# Patient Record
Sex: FEMALE | Race: WHITE | NOT HISPANIC OR LATINO | Employment: UNEMPLOYED | URBAN - METROPOLITAN AREA
[De-identification: names, ages, dates, MRNs, and addresses within clinical notes are randomized per-mention and may not be internally consistent; named-entity substitution may affect disease eponyms.]

---

## 2020-01-06 ENCOUNTER — OFFICE VISIT (OUTPATIENT)
Dept: INTERNAL MEDICINE | Facility: CLINIC | Age: 30
End: 2020-01-06
Payer: COMMERCIAL

## 2020-01-06 VITALS
BODY MASS INDEX: 51.91 KG/M2 | WEIGHT: 293 LBS | HEIGHT: 63 IN | SYSTOLIC BLOOD PRESSURE: 122 MMHG | TEMPERATURE: 97.4 F | RESPIRATION RATE: 16 BRPM | HEART RATE: 104 BPM | DIASTOLIC BLOOD PRESSURE: 84 MMHG | OXYGEN SATURATION: 96 %

## 2020-01-06 DIAGNOSIS — R05.9 COUGH: Primary | ICD-10-CM

## 2020-01-06 LAB
DEPRECATED S PYO AG THROAT QL EIA: NORMAL
FLUAV+FLUBV AG SPEC QL: NEGATIVE
FLUAV+FLUBV AG SPEC QL: NEGATIVE
SPECIMEN SOURCE: NORMAL
SPECIMEN SOURCE: NORMAL

## 2020-01-06 PROCEDURE — 87804 INFLUENZA ASSAY W/OPTIC: CPT | Performed by: NURSE PRACTITIONER

## 2020-01-06 PROCEDURE — 99202 OFFICE O/P NEW SF 15 MIN: CPT | Performed by: NURSE PRACTITIONER

## 2020-01-06 PROCEDURE — 87081 CULTURE SCREEN ONLY: CPT | Performed by: NURSE PRACTITIONER

## 2020-01-06 PROCEDURE — 87880 STREP A ASSAY W/OPTIC: CPT | Performed by: NURSE PRACTITIONER

## 2020-01-06 RX ORDER — ALBUTEROL SULFATE 90 UG/1
2 AEROSOL, METERED RESPIRATORY (INHALATION) EVERY 6 HOURS
Qty: 1 INHALER | Refills: 0 | Status: SHIPPED | OUTPATIENT
Start: 2020-01-06 | End: 2020-07-24

## 2020-01-06 SDOH — HEALTH STABILITY: MENTAL HEALTH: HOW OFTEN DO YOU HAVE A DRINK CONTAINING ALCOHOL?: NEVER

## 2020-01-06 ASSESSMENT — MIFFLIN-ST. JEOR: SCORE: 2101.89

## 2020-01-06 NOTE — NURSING NOTE
"cold starting 12/26/19, fever,chills and cough kicked on on 01/01/2020  /84 (BP Location: Right arm, Patient Position: Sitting, Cuff Size: Adult Large)   Pulse 104   Temp 97.4  F (36.3  C) (Oral)   Resp 16   Ht 1.588 m (5' 2.5\")   Wt 140.6 kg (309 lb 14.4 oz)   LMP 12/31/2019 (Exact Date)   SpO2 96%   BMI 55.78 kg/m      "

## 2020-01-06 NOTE — PROGRESS NOTES
"Subjective     Iris Toscano is a 29 year old female who presents to clinic today for the following health issues:    HPI   RESPIRATORY SYMPTOMS      Duration: 10 days    Description  nasal congestion, rhinorrhea, sore throat, cough, ear pain bilateral and fatigue/malaise  Initial \"cold\" sx started to resolve.  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    History reviewed. No pertinent surgical history.    Social History     Tobacco Use     Smoking status: Never Smoker     Smokeless tobacco: Never Used   Substance Use Topics     Alcohol use: Never     Frequency: Never     Family History   Problem Relation Age of Onset     Hypertension Maternal Grandmother      Cerebrovascular Disease Maternal Grandmother      Breast Cancer Maternal Grandmother      C.A.D. Maternal Grandmother      Breast Cancer Mother      Cancer Mother         leukemia     Lipids Mother      Thyroid Disease Mother      Depression Maternal Aunt      Lipids Father      Lipids Sister      Lipids Other         grandparents     Diabetes Maternal Grandfather          No current outpatient medications on file.     BP Readings from Last 3 Encounters:   01/06/20 122/84   01/26/12 129/63   01/04/11 118/70    Wt Readings from Last 3 Encounters:   01/06/20 140.6 kg (309 lb 14.4 oz)   01/04/11 113.9 kg (251 lb) (>99 %)*     * Growth percentiles are based on CDC (Girls, 2-20 Years) data.                      Reviewed and updated as needed this visit by Provider         Review of Systems   ROS COMP: RESP:NEGATIVE for SOB/dyspnea and wheezing  CV: NEGATIVE for chest pain, palpitations or peripheral edema  ROS otherwise " "negative      Objective    /84 (BP Location: Right arm, Patient Position: Sitting, Cuff Size: Adult Large)   Pulse 104   Temp 97.4  F (36.3  C) (Oral)   Resp 16   Ht 1.588 m (5' 2.5\")   Wt 140.6 kg (309 lb 14.4 oz)   LMP 12/31/2019 (Exact Date)   SpO2 96%   BMI 55.78 kg/m    Body mass index is 55.78 kg/m .  Physical Exam   GENERAL: alert, no distress, obese and fatigued  EYES: Eyes grossly normal to inspection  HENT: ear canals and TM's normal, nose and mouth without ulcers or lesions  NECK: no adenopathy, no asymmetry, masses, or scars and thyroid normal to palpation  RESP: lungs clear to auscultation - no rales, rhonchi or wheezes  CV: regular rate and rhythm, normal S1 S2, no S3 or S4, no murmur, click or rub, no peripheral edema and peripheral pulses strong  PSYCH: mentation appears normal, tearful, anxious and fatigued    Results for orders placed or performed in visit on 01/06/20 (from the past 24 hour(s))   Rapid strep screen   Result Value Ref Range    Specimen Description Throat     Rapid Strep A Screen       NEGATIVE: No Group A streptococcal antigen detected by immunoassay, await culture report.   Influenza A/B antigen   Result Value Ref Range    Influenza A/B Agn Specimen Nasal     Influenza A Negative NEG^Negative    Influenza B Negative NEG^Negative           Assessment & Plan       ICD-10-CM    1. Cough R05 Rapid strep screen     Influenza A/B antigen     Beta strep group A culture     albuterol (PROAIR HFA/PROVENTIL HFA/VENTOLIN HFA) 108 (90 Base) MCG/ACT inhaler        BMI:   Estimated body mass index is 55.78 kg/m  as calculated from the following:    Height as of this encounter: 1.588 m (5' 2.5\").    Weight as of this encounter: 140.6 kg (309 lb 14.4 oz).           Inhaler as directed  Pt declined tessalon and robitussin AC  Elevated HOB  RTW letter done.  Consider yearly flu shot    Bettina Andrade NP  [facility]        "

## 2020-01-06 NOTE — LETTER
January 6, 2020      Iris Toscano  [address] [id number]        To Whom It May Concern:    Iris Toscano was seen in our clinic for upper respiratory illness. She may return to work with the following: no work in cold temperatures  For 1 weekk.      Sincerely,        Bettina Andrade NP

## 2020-01-07 LAB
BACTERIA SPEC CULT: NORMAL
SPECIMEN SOURCE: NORMAL

## 2020-07-24 ENCOUNTER — OFFICE VISIT (OUTPATIENT)
Dept: INTERNAL MEDICINE | Facility: CLINIC | Age: 30
End: 2020-07-24
Payer: COMMERCIAL

## 2020-07-24 VITALS
WEIGHT: 293 LBS | HEART RATE: 93 BPM | SYSTOLIC BLOOD PRESSURE: 120 MMHG | DIASTOLIC BLOOD PRESSURE: 70 MMHG | OXYGEN SATURATION: 97 % | TEMPERATURE: 98 F | RESPIRATION RATE: 16 BRPM | BODY MASS INDEX: 51.91 KG/M2 | HEIGHT: 63 IN

## 2020-07-24 DIAGNOSIS — Z00.00 ROUTINE HISTORY AND PHYSICAL EXAMINATION OF ADULT: Primary | ICD-10-CM

## 2020-07-24 DIAGNOSIS — F32.0 CURRENT MILD EPISODE OF MAJOR DEPRESSIVE DISORDER WITHOUT PRIOR EPISODE (H): ICD-10-CM

## 2020-07-24 DIAGNOSIS — N92.6 IRREGULAR PERIODS: ICD-10-CM

## 2020-07-24 DIAGNOSIS — R20.0 NUMBNESS: ICD-10-CM

## 2020-07-24 DIAGNOSIS — F41.1 GAD (GENERALIZED ANXIETY DISORDER): ICD-10-CM

## 2020-07-24 DIAGNOSIS — R32 URINARY INCONTINENCE, UNSPECIFIED TYPE: ICD-10-CM

## 2020-07-24 DIAGNOSIS — E66.01 MORBID OBESITY (H): ICD-10-CM

## 2020-07-24 DIAGNOSIS — F84.5 ASPERGER'S DISORDER: ICD-10-CM

## 2020-07-24 DIAGNOSIS — R73.9 HYPERGLYCEMIA: ICD-10-CM

## 2020-07-24 LAB
ALBUMIN UR-MCNC: NEGATIVE MG/DL
APPEARANCE UR: ABNORMAL
BACTERIA #/AREA URNS HPF: ABNORMAL /HPF
BASOPHILS # BLD AUTO: 0 10E9/L (ref 0–0.2)
BASOPHILS NFR BLD AUTO: 0.1 %
BILIRUB UR QL STRIP: NEGATIVE
COLOR UR AUTO: YELLOW
DIFFERENTIAL METHOD BLD: ABNORMAL
EOSINOPHIL # BLD AUTO: 0 10E9/L (ref 0–0.7)
EOSINOPHIL NFR BLD AUTO: 0.1 %
ERYTHROCYTE [DISTWIDTH] IN BLOOD BY AUTOMATED COUNT: 14.9 % (ref 10–15)
GLUCOSE UR STRIP-MCNC: NEGATIVE MG/DL
HBA1C MFR BLD: 5.3 % (ref 0–5.6)
HCT VFR BLD AUTO: 37.4 % (ref 35–47)
HGB BLD-MCNC: 12 G/DL (ref 11.7–15.7)
HGB UR QL STRIP: ABNORMAL
KETONES UR STRIP-MCNC: NEGATIVE MG/DL
LEUKOCYTE ESTERASE UR QL STRIP: NEGATIVE
LYMPHOCYTES # BLD AUTO: 2.3 10E9/L (ref 0.8–5.3)
LYMPHOCYTES NFR BLD AUTO: 26 %
MCH RBC QN AUTO: 25.1 PG (ref 26.5–33)
MCHC RBC AUTO-ENTMCNC: 32.1 G/DL (ref 31.5–36.5)
MCV RBC AUTO: 78 FL (ref 78–100)
MONOCYTES # BLD AUTO: 0.7 10E9/L (ref 0–1.3)
MONOCYTES NFR BLD AUTO: 7.7 %
NEUTROPHILS # BLD AUTO: 5.8 10E9/L (ref 1.6–8.3)
NEUTROPHILS NFR BLD AUTO: 66.1 %
NITRATE UR QL: NEGATIVE
NON-SQ EPI CELLS #/AREA URNS LPF: ABNORMAL /LPF
PH UR STRIP: 5.5 PH (ref 5–7)
PLATELET # BLD AUTO: 282 10E9/L (ref 150–450)
RBC # BLD AUTO: 4.78 10E12/L (ref 3.8–5.2)
RBC #/AREA URNS AUTO: ABNORMAL /HPF
SOURCE: ABNORMAL
SP GR UR STRIP: 1.02 (ref 1–1.03)
UROBILINOGEN UR STRIP-ACNC: 0.2 EU/DL (ref 0.2–1)
VIT B12 SERPL-MCNC: 417 PG/ML (ref 193–986)
WBC # BLD AUTO: 8.8 10E9/L (ref 4–11)
WBC #/AREA URNS AUTO: ABNORMAL /HPF

## 2020-07-24 PROCEDURE — 83036 HEMOGLOBIN GLYCOSYLATED A1C: CPT | Performed by: INTERNAL MEDICINE

## 2020-07-24 PROCEDURE — 80053 COMPREHEN METABOLIC PANEL: CPT | Performed by: INTERNAL MEDICINE

## 2020-07-24 PROCEDURE — 80061 LIPID PANEL: CPT | Performed by: INTERNAL MEDICINE

## 2020-07-24 PROCEDURE — 81001 URINALYSIS AUTO W/SCOPE: CPT | Performed by: INTERNAL MEDICINE

## 2020-07-24 PROCEDURE — 84443 ASSAY THYROID STIM HORMONE: CPT | Performed by: INTERNAL MEDICINE

## 2020-07-24 PROCEDURE — 36415 COLL VENOUS BLD VENIPUNCTURE: CPT | Performed by: INTERNAL MEDICINE

## 2020-07-24 PROCEDURE — 85025 COMPLETE CBC W/AUTO DIFF WBC: CPT | Performed by: INTERNAL MEDICINE

## 2020-07-24 PROCEDURE — 99395 PREV VISIT EST AGE 18-39: CPT | Performed by: INTERNAL MEDICINE

## 2020-07-24 PROCEDURE — 99213 OFFICE O/P EST LOW 20 MIN: CPT | Mod: 25 | Performed by: INTERNAL MEDICINE

## 2020-07-24 PROCEDURE — 82607 VITAMIN B-12: CPT | Performed by: INTERNAL MEDICINE

## 2020-07-24 RX ORDER — BUPROPION HYDROCHLORIDE 150 MG/1
150 TABLET ORAL EVERY MORNING
Qty: 90 TABLET | Refills: 0 | Status: SHIPPED | OUTPATIENT
Start: 2020-07-24 | End: 2020-10-19

## 2020-07-24 ASSESSMENT — ANXIETY QUESTIONNAIRES
1. FEELING NERVOUS, ANXIOUS, OR ON EDGE: NEARLY EVERY DAY
6. BECOMING EASILY ANNOYED OR IRRITABLE: MORE THAN HALF THE DAYS
GAD7 TOTAL SCORE: 13
5. BEING SO RESTLESS THAT IT IS HARD TO SIT STILL: SEVERAL DAYS
3. WORRYING TOO MUCH ABOUT DIFFERENT THINGS: MORE THAN HALF THE DAYS
2. NOT BEING ABLE TO STOP OR CONTROL WORRYING: MORE THAN HALF THE DAYS
7. FEELING AFRAID AS IF SOMETHING AWFUL MIGHT HAPPEN: MORE THAN HALF THE DAYS

## 2020-07-24 ASSESSMENT — PATIENT HEALTH QUESTIONNAIRE - PHQ9
5. POOR APPETITE OR OVEREATING: SEVERAL DAYS
SUM OF ALL RESPONSES TO PHQ QUESTIONS 1-9: 15

## 2020-07-24 ASSESSMENT — ACTIVITIES OF DAILY LIVING (ADL): CURRENT_FUNCTION: NO ASSISTANCE NEEDED

## 2020-07-24 ASSESSMENT — MIFFLIN-ST. JEOR: SCORE: 2168.78

## 2020-07-24 NOTE — LETTER
July 28, 2020      Iris Toscano  [address][1] [id number]        Dear ,    We are writing to inform you of your test results.    The cholesterol level LDL is at goal.   The kidney function (GFR) and liver function tests (AST and ALT) are within normal limits.     The fasting glucose is within normal limits. The hemoglobin A1c is within normal limits.   The thyroid test (TSH) is within normal limits.   The blood counts (CBC) are within normal limits.   The B12 is within normal limits.     Resulted Orders   Lipid panel reflex to direct LDL Fasting   Result Value Ref Range    Cholesterol 150 <200 mg/dL    Triglycerides 66 <150 mg/dL      Comment:      Fasting specimen    HDL Cholesterol 36 (L) >49 mg/dL    LDL Cholesterol Calculated 101 (H) <100 mg/dL      Comment:      Above desirable:  100-129 mg/dl  Borderline High:  130-159 mg/dL  High:             160-189 mg/dL  Very high:       >189 mg/dl      Non HDL Cholesterol 114 <130 mg/dL   **Comprehensive metabolic panel FUTURE anytime   Result Value Ref Range    Sodium 136 133 - 144 mmol/L    Potassium 4.0 3.4 - 5.3 mmol/L    Chloride 108 94 - 109 mmol/L    Carbon Dioxide 21 20 - 32 mmol/L    Anion Gap 8 3 - 14 mmol/L    Glucose 90 70 - 99 mg/dL      Comment:      Fasting specimen    Urea Nitrogen 14 7 - 30 mg/dL    Creatinine 0.65 0.52 - 1.04 mg/dL    GFR Estimate >90 >60 mL/min/[1.73_m2]      Comment:      Non  GFR Calc  Starting 12/18/2018, serum creatinine based estimated GFR (eGFR) will be   calculated using the Chronic Kidney Disease Epidemiology Collaboration   (CKD-EPI) equation.      GFR Estimate If Black >90 >60 mL/min/[1.73_m2]      Comment:       GFR Calc  Starting 12/18/2018, serum creatinine based estimated GFR (eGFR) will be   calculated using the Chronic Kidney Disease Epidemiology Collaboration   (CKD-EPI) equation.      Calcium 8.3 (L) 8.5 - 10.1 mg/dL    Bilirubin Total 0.3 0.2 - 1.3 mg/dL     Albumin 3.3 (L) 3.4 - 5.0 g/dL    Protein Total 7.5 6.8 - 8.8 g/dL    Alkaline Phosphatase 74 40 - 150 U/L    ALT 18 0 - 50 U/L    AST 11 0 - 45 U/L   **TSH with free T4 reflex FUTURE anytime   Result Value Ref Range    TSH 2.81 0.40 - 4.00 mU/L   CBC with platelets and differential   Result Value Ref Range    WBC 8.8 4.0 - 11.0 10e9/L    RBC Count 4.78 3.8 - 5.2 10e12/L    Hemoglobin 12.0 11.7 - 15.7 g/dL    Hematocrit 37.4 35.0 - 47.0 %    MCV 78 78 - 100 fl    MCH 25.1 (L) 26.5 - 33.0 pg    MCHC 32.1 31.5 - 36.5 g/dL    RDW 14.9 10.0 - 15.0 %    Platelet Count 282 150 - 450 10e9/L    % Neutrophils 66.1 %    % Lymphocytes 26.0 %    % Monocytes 7.7 %    % Eosinophils 0.1 %    % Basophils 0.1 %    Absolute Neutrophil 5.8 1.6 - 8.3 10e9/L    Absolute Lymphocytes 2.3 0.8 - 5.3 10e9/L    Absolute Monocytes 0.7 0.0 - 1.3 10e9/L    Absolute Eosinophils 0.0 0.0 - 0.7 10e9/L    Absolute Basophils 0.0 0.0 - 0.2 10e9/L    Diff Method Automated Method    Hemoglobin A1c   Result Value Ref Range    Hemoglobin A1C 5.3 0 - 5.6 %      Comment:      Normal <5.7% Prediabetes 5.7-6.4%  Diabetes 6.5% or higher - adopted from ADA   consensus guidelines.     Vitamin B12   Result Value Ref Range    Vitamin B12 417 193 - 986 pg/mL   UA with Microscopic reflex to Culture   Result Value Ref Range    Color Urine Yellow     Appearance Urine Slightly Cloudy     Glucose Urine Negative NEG^Negative mg/dL    Bilirubin Urine Negative NEG^Negative    Ketones Urine Negative NEG^Negative mg/dL    Specific Gravity Urine 1.025 1.003 - 1.035    pH Urine 5.5 5.0 - 7.0 pH    Protein Albumin Urine Negative NEG^Negative mg/dL    Urobilinogen Urine 0.2 0.2 - 1.0 EU/dL    Nitrite Urine Negative NEG^Negative    Blood Urine Trace (A) NEG^Negative    Leukocyte Esterase Urine Negative NEG^Negative    Source Midstream Urine     WBC Urine 0 - 5 OTO5^0 - 5 /HPF    RBC Urine O - 2 OTO2^O - 2 /HPF    Squamous Epithelial /LPF Urine Many (A) FEW^Few /LPF    Bacteria  Urine Moderate (A) NEG^Negative /HPF       If you have any questions or concerns, please call the clinic at the number listed above.       Sincerely,        Amanda Connell MD

## 2020-07-24 NOTE — PROGRESS NOTES
"   SUBJECTIVE:   CC: Iris Toscano is an 30 year old woman who presents for preventive health visit.     Healthy Habits:    In general, how would you rate your overall health?  Fair    Frequency of exercise:  4-5 days/week    Duration of exercise:  Other    Do you usually eat at least 4 servings of fruit and vegetables a day, include whole grains    & fiber and avoid regularly eating high fat or \"junk\" foods?  No    Taking medications regularly:  Not Applicable    Barriers to taking medications:  Not applicable    Medication side effects:  Not applicable    Ability to successfully perform activities of daily living:  No assistance needed    Home Safety:  No safety concerns identified    Hearing Impairment:  No hearing concerns    In the past 6 months, have you been bothered by leaking of urine? Yes    In general, how would you rate your overall mental or emotional health?  Fair      PHQ-2 Total Score:    Additional concerns today:  No    Depression.  phq9 of 15 today.  Would not harm self.   JAYLYN of 13 today.  Has been on medication for mood in the past.    She reports that she has seen therapists in the past since age 11.   She did not know she had autism until college.      Obesity. No regular exercise.  She is not on a diet. She has done drastic fasting before.  3 days per week no eating    The patient also has had irregular periods since age [age].      March of 2018 she started a job.  She is a .She noticed issues of left leg numbness.  The patient was standing in choir and started to become numb in her knee.    The patient report that there is more numbness/tingling around her knee.  No back pain.    The patient reports that she locks her left leg.    The numbness/tingling is now constant.      Today's PHQ-2 Score: No flowsheet data found.    Abuse: Current or Past(Physical, Sexual or Emotional)- No  Do you feel safe in your environment? Yes      Social History     Tobacco Use     Smoking status: Never " "Smoker     Smokeless tobacco: Never Used   Substance Use Topics     Alcohol use: Never     Frequency: Never     If you drink alcohol do you typically have >3 drinks per day or >7 drinks per week? No    Alcohol Use 7/24/2020   Prescreen: >3 drinks/day or >7 drinks/week? No       Reviewed orders with patient.  Reviewed health maintenance and updated orders accordingly - Yes      Mammogram not appropriate for this patient based on age.    Pertinent mammograms are reviewed under the imaging tab.  History of abnormal Pap smear: not sexually active     Reviewed and updated as needed this visit by clinical staff  Tobacco  Allergies  Meds  Problems  Med Hx  Surg Hx  Fam Hx  Soc Hx          Reviewed and updated as needed this visit by Provider  Allergies  Meds  Problems            Review of Systems  CONSTITUTIONAL: NEGATIVE for fever, chills, change in weight  INTEGUMENTARU/SKIN: NEGATIVE for worrisome rashes, moles or lesions  EYES: NEGATIVE for vision changes or irritation  ENT: NEGATIVE for ear, mouth and throat problems  RESP: NEGATIVE for significant cough or SOB  BREAST: NEGATIVE for masses, tenderness or discharge  CV: NEGATIVE for chest pain, palpitations or peripheral edema  GI: NEGATIVE for nausea, abdominal pain, heartburn, or change in bowel habits  : NEGATIVE for unusual urinary or vaginal symptoms. Periods are irregular.   MUSCULOSKELETAL: NEGATIVE for significant arthralgias or myalgia  NEURO: NEGATIVE for weakness, dizziness or paresthesias  PSYCHIATRIC: NEGATIVE for changes in mood or affect     OBJECTIVE:   /70   Pulse 93   Temp 98  F (36.7  C) (Oral)   Resp 16   Ht 1.6 m (5' 3\")   Wt 147 kg (324 lb)   LMP 07/05/2020   SpO2 97%   Breastfeeding No   BMI 57.39 kg/m    Physical Exam  GENERAL: healthy, alert and no distress  EYES: Eyes grossly normal to inspection, PERRL and conjunctivae and sclerae normal  HENT: ear canals and TM's normal, nose and mouth without ulcers or " lesions  NECK: no adenopathy, no asymmetry, masses, or scars and thyroid normal to palpation  RESP: lungs clear to auscultation - no rales, rhonchi or wheezes  BREAST: normal without masses, tenderness or nipple discharge and no palpable axillary masses or adenopathy  CV: regular rate and rhythm, normal S1 S2, no S3 or S4, no murmur, click or rub, no peripheral edema and peripheral pulses strong  ABDOMEN: soft, nontender, no hepatosplenomegaly, no masses and bowel sounds normal  MS: no gross musculoskeletal defects noted, no edema  SKIN: no suspicious lesions or rashes  NEURO: Normal strength and tone, mentation intact and speech normal  PSYCH: mentation appears normal, affect normal/bright    Diagnostic Test Results:  Labs reviewed in Epic    ASSESSMENT/PLAN:       ICD-10-CM    1. Routine history and physical examination of adult  Z00.00 Lipid panel reflex to direct LDL Fasting     **Comprehensive metabolic panel FUTURE anytime     **TSH with free T4 reflex FUTURE anytime     CBC with platelets and differential     Hemoglobin A1c   2. Current mild episode of major depressive disorder without prior episode (H)  F32.0 buPROPion (WELLBUTRIN XL) 150 MG 24 hr tablet   3. Morbid obesity (H)  E66.01 COMPREHENSIVE WEIGHT MANAGEMENT     Hemoglobin A1c   4. Asperger's disorder  F84.5    5. JAYLYN (generalized anxiety disorder)  F41.1    6. Irregular periods  N92.6 OB/GYN REFERRAL   7. Hyperglycemia  R73.9 Hemoglobin A1c   8. Numbness  R20.0 NEUROLOGY ADULT REFERRAL     Vitamin B12   9. Urinary incontinence, unspecified type  R32 UA with Microscopic reflex to Culture   start wellbutrin  Follow up in 4 weeks    Obesity. Do not recommend drastic fasting. Recommend she discuss with bariatric clinic.    COUNSELING:  Reviewed preventive health counseling, as reflected in patient instructions       Regular exercise       Healthy diet/nutrition    Estimated body mass index is 57.39 kg/m  as calculated from the following:    Height as  "of this encounter: 1.6 m (5' 3\").    Weight as of this encounter: 147 kg (324 lb).         reports that she has never smoked. She has never used smokeless tobacco.      Counseling Resources:  ATP IV Guidelines  Pooled Cohorts Equation Calculator  Breast Cancer Risk Calculator  FRAX Risk Assessment  ICSI Preventive Guidelines  Dietary Guidelines for Americans, 2010  USDA's MyPlate  ASA Prophylaxis  Lung CA Screening    Amanda Connell MD  [facility]  "

## 2020-07-25 LAB
ALBUMIN SERPL-MCNC: 3.3 G/DL (ref 3.4–5)
ALP SERPL-CCNC: 74 U/L (ref 40–150)
ALT SERPL W P-5'-P-CCNC: 18 U/L (ref 0–50)
ANION GAP SERPL CALCULATED.3IONS-SCNC: 8 MMOL/L (ref 3–14)
AST SERPL W P-5'-P-CCNC: 11 U/L (ref 0–45)
BILIRUB SERPL-MCNC: 0.3 MG/DL (ref 0.2–1.3)
BUN SERPL-MCNC: 14 MG/DL (ref 7–30)
CALCIUM SERPL-MCNC: 8.3 MG/DL (ref 8.5–10.1)
CHLORIDE SERPL-SCNC: 108 MMOL/L (ref 94–109)
CHOLEST SERPL-MCNC: 150 MG/DL
CO2 SERPL-SCNC: 21 MMOL/L (ref 20–32)
CREAT SERPL-MCNC: 0.65 MG/DL (ref 0.52–1.04)
GFR SERPL CREATININE-BSD FRML MDRD: >90 ML/MIN/{1.73_M2}
GLUCOSE SERPL-MCNC: 90 MG/DL (ref 70–99)
HDLC SERPL-MCNC: 36 MG/DL
LDLC SERPL CALC-MCNC: 101 MG/DL
NONHDLC SERPL-MCNC: 114 MG/DL
POTASSIUM SERPL-SCNC: 4 MMOL/L (ref 3.4–5.3)
PROT SERPL-MCNC: 7.5 G/DL (ref 6.8–8.8)
SODIUM SERPL-SCNC: 136 MMOL/L (ref 133–144)
TRIGL SERPL-MCNC: 66 MG/DL
TSH SERPL DL<=0.005 MIU/L-ACNC: 2.81 MU/L (ref 0.4–4)

## 2020-07-25 ASSESSMENT — ANXIETY QUESTIONNAIRES: GAD7 TOTAL SCORE: 13

## 2020-08-26 ENCOUNTER — TRANSFERRED RECORDS (OUTPATIENT)
Dept: HEALTH INFORMATION MANAGEMENT | Facility: CLINIC | Age: 30
End: 2020-08-26

## 2020-09-24 ENCOUNTER — TRANSFERRED RECORDS (OUTPATIENT)
Dept: HEALTH INFORMATION MANAGEMENT | Facility: CLINIC | Age: 30
End: 2020-09-24

## 2020-10-15 DIAGNOSIS — F32.0 CURRENT MILD EPISODE OF MAJOR DEPRESSIVE DISORDER WITHOUT PRIOR EPISODE (H): ICD-10-CM

## 2020-10-19 RX ORDER — BUPROPION HYDROCHLORIDE 150 MG/1
TABLET ORAL
Qty: 90 TABLET | Refills: 0 | Status: SHIPPED | OUTPATIENT
Start: 2020-10-19 | End: 2022-02-01

## 2021-12-19 ENCOUNTER — HEALTH MAINTENANCE LETTER (OUTPATIENT)
Age: 31
End: 2021-12-19

## 2022-02-01 ENCOUNTER — OFFICE VISIT (OUTPATIENT)
Dept: INTERNAL MEDICINE | Facility: CLINIC | Age: 32
End: 2022-02-01
Payer: COMMERCIAL

## 2022-02-01 VITALS
OXYGEN SATURATION: 98 % | HEART RATE: 102 BPM | BODY MASS INDEX: 51.91 KG/M2 | TEMPERATURE: 98.1 F | DIASTOLIC BLOOD PRESSURE: 99 MMHG | WEIGHT: 293 LBS | SYSTOLIC BLOOD PRESSURE: 148 MMHG | HEIGHT: 63 IN | RESPIRATION RATE: 16 BRPM

## 2022-02-01 DIAGNOSIS — F33.1 MODERATE EPISODE OF RECURRENT MAJOR DEPRESSIVE DISORDER (H): ICD-10-CM

## 2022-02-01 DIAGNOSIS — R09.81 NASAL CONGESTION: ICD-10-CM

## 2022-02-01 DIAGNOSIS — R94.6 THYROID FUNCTION TEST ABNORMAL: ICD-10-CM

## 2022-02-01 DIAGNOSIS — E66.01 MORBID OBESITY (H): ICD-10-CM

## 2022-02-01 DIAGNOSIS — R73.09 BLOOD GLUCOSE ABNORMAL: ICD-10-CM

## 2022-02-01 DIAGNOSIS — D72.829 LEUKOCYTOSIS, UNSPECIFIED TYPE: ICD-10-CM

## 2022-02-01 DIAGNOSIS — R71.8 LOW MEAN CORPUSCULAR VOLUME (MCV): ICD-10-CM

## 2022-02-01 DIAGNOSIS — Z00.00 ENCOUNTER FOR ROUTINE ADULT HEALTH EXAMINATION WITHOUT ABNORMAL FINDINGS: Primary | ICD-10-CM

## 2022-02-01 DIAGNOSIS — F41.9 ANXIETY: ICD-10-CM

## 2022-02-01 DIAGNOSIS — R03.0 ELEVATED BLOOD PRESSURE READING WITHOUT DIAGNOSIS OF HYPERTENSION: ICD-10-CM

## 2022-02-01 DIAGNOSIS — L68.0 HIRSUTISM: ICD-10-CM

## 2022-02-01 DIAGNOSIS — N92.6 IRREGULAR MENSTRUAL CYCLE: ICD-10-CM

## 2022-02-01 PROCEDURE — 99214 OFFICE O/P EST MOD 30 MIN: CPT | Mod: 25 | Performed by: INTERNAL MEDICINE

## 2022-02-01 PROCEDURE — 99395 PREV VISIT EST AGE 18-39: CPT | Performed by: INTERNAL MEDICINE

## 2022-02-01 ASSESSMENT — ENCOUNTER SYMPTOMS
PALPITATIONS: 0
HEMATOCHEZIA: 0
NAUSEA: 0
EYE PAIN: 0
DYSURIA: 0
CONSTIPATION: 0
BREAST MASS: 0
DIARRHEA: 0
WEAKNESS: 0
MYALGIAS: 0
FEVER: 0
SHORTNESS OF BREATH: 0
ARTHRALGIAS: 0
HEADACHES: 0
SORE THROAT: 0
HEARTBURN: 0
HEMATURIA: 0
PARESTHESIAS: 0
JOINT SWELLING: 0
ABDOMINAL PAIN: 0
CHILLS: 0
DIZZINESS: 0
NERVOUS/ANXIOUS: 1
FREQUENCY: 0
COUGH: 0

## 2022-02-01 ASSESSMENT — ACTIVITIES OF DAILY LIVING (ADL): CURRENT_FUNCTION: NO ASSISTANCE NEEDED

## 2022-02-01 ASSESSMENT — PATIENT HEALTH QUESTIONNAIRE - PHQ9
10. IF YOU CHECKED OFF ANY PROBLEMS, HOW DIFFICULT HAVE THESE PROBLEMS MADE IT FOR YOU TO DO YOUR WORK, TAKE CARE OF THINGS AT HOME, OR GET ALONG WITH OTHER PEOPLE: VERY DIFFICULT
SUM OF ALL RESPONSES TO PHQ QUESTIONS 1-9: 12
SUM OF ALL RESPONSES TO PHQ QUESTIONS 1-9: 12

## 2022-02-01 ASSESSMENT — MIFFLIN-ST. JEOR: SCORE: 2182.15

## 2022-02-01 NOTE — PROGRESS NOTES
"   SUBJECTIVE:   CC: Iris Toscano is an 31 year old woman who presents for preventive health visit.       Patient has been advised of split billing requirements and indicates understanding: Yes  Healthy Habits:     In general, how would you rate your overall health?  Fair    Frequency of exercise:  None    Do you usually eat at least 4 servings of fruit and vegetables a day, include whole grains    & fiber and avoid regularly eating high fat or \"junk\" foods?  No    Taking medications regularly:  Not Applicable    Medication side effects:  Not applicable    Ability to successfully perform activities of daily living:  No assistance needed    Home Safety:  No safety concerns identified    Hearing Impairment:  No hearing concerns    In the past 6 months, have you been bothered by leaking of urine? Yes    In general, how would you rate your overall mental or emotional health?  Poor      PHQ-2 Total Score: 6    Additional concerns today:  Yes    Ability to successfully perform activities of daily living: Yes, no assistance needed  Home safety:  none identified   Hearing impairment: None     She is establishing care with me as her previous provider left the clinic.    Current concerns:  1.  She reports she has had depression and anxiety intermittently since age 20.  She reports she felt some increased depression symptoms yesterday.  She has been treated with a number of different medications in the past, the only when she could recall was Wellbutrin.  She said she took it for a month then stopped it, then went back on a couple months.  She felt some heightened emotions at first.  She is not sure it really helped her mood that much.  She reports she sometimes gets \"nervous energy\" but does not really describe chanelle, often gets tired afterwards, does not stay awake for a long period of time.  She does not think she wants to work with a counselor at all.    2.  Irregular periods: She has never had regular cycles, " sometimes they are 2 weeks, sometimes 5 weeks apart.  She is concerned about possible PCOS, has some excessive hair growth, particularly around the face and abdomen.  She never seems to go more than 2 months without menses.  She has occasional acne.    3.  Nasal symptoms: She has a lot of nasal obstruction, tends to have a lot of mucus in the morning.  This started almost 2 years ago.  She has had some decrease in taste and smell in the past year but did not have Covid.  This improved last summer, then worsened again this winter.        Patient Active Problem List   Diagnosis     Asperger's disorder     Morbid obesity (H)     Moderate episode of recurrent major depressive disorder (H)     Anxiety     No current outpatient medications on file.            Today's PHQ-2 Score:   PHQ-2 ( 1999 [employer]) 2/1/2022   Q1: Little interest or pleasure in doing things 3   Q2: Feeling down, depressed or hopeless 3   PHQ-2 Score 6   Q1: Little interest or pleasure in doing things Nearly every day   Q2: Feeling down, depressed or hopeless Nearly every day   PHQ-2 Score 6       Abuse: Current or Past (Physical, Sexual or Emotional) - No  Do you feel safe in your environment? Yes    Have you ever done Advance Care Planning? (For example, a Health Directive, POLST, or a discussion with a medical provider or your loved ones about your wishes): No, advance care planning information given to patient to review.  Patient plans to discuss their wishes with loved ones or provider.      Social History     Tobacco Use     Smoking status: Never Smoker     Smokeless tobacco: Never Used   Substance Use Topics     Alcohol use: Never     If you drink alcohol do you typically have >3 drinks per day or >7 drinks per week? No    Alcohol Use 2/1/2022   Prescreen: >3 drinks/day or >7 drinks/week? Not Applicable   Prescreen: >3 drinks/day or >7 drinks/week? -   No flowsheet data found.    Reviewed orders with patient.  Reviewed health maintenance and  "updated orders accordingly - Yes      Breast Cancer Screening:  Any new diagnosis of family breast, ovarian, or bowel cancer?     FHS-7: No flowsheet data found.  click delete button to remove this line now  Patient under 40 years of age: Routine Mammogram Screening not recommended.   Pertinent mammograms are reviewed under the imaging tab.    History of abnormal Pap smear: no, declines as she has never been sexually active.     Reviewed and updated as needed this visit by clinical staff                Reviewed and updated as needed this visit by Provider                   Review of Systems   Constitutional: Negative for chills and fever.   HENT: Negative for congestion, ear pain, hearing loss and sore throat.    Eyes: Negative for pain and visual disturbance.   Respiratory: Negative for cough and shortness of breath.    Cardiovascular: Negative for chest pain, palpitations and peripheral edema.   Gastrointestinal: Negative for abdominal pain, constipation, diarrhea, heartburn, hematochezia and nausea.   Breasts:  Negative for tenderness, breast mass and discharge.   Genitourinary: Negative for dysuria, frequency, genital sores, hematuria, pelvic pain, urgency, vaginal bleeding and vaginal discharge.   Musculoskeletal: Negative for arthralgias, joint swelling and myalgias.   Skin: Negative for rash.   Neurological: Negative for dizziness, weakness, headaches and paresthesias.   Psychiatric/Behavioral: Positive for mood changes. The patient is nervous/anxious.      She reports she sometimes gets sores under her breasts, in the axilla and groin.  None present now.  They are not open, may be more acne-like.    OBJECTIVE:   BP (!) 148/99 (BP Location: Right arm, Patient Position: Sitting, Cuff Size: Adult Regular)   Pulse 102   Temp 98.1  F (36.7  C) (Oral)   Resp 16   Ht 1.588 m (5' 2.5\")   Wt 149.6 kg (329 lb 12.8 oz)   LMP 11/26/2021   SpO2 98%   BMI 59.36 kg/m    Physical Exam    HEENT: extraocular movements " are intact, pupils equal and reactive to light and accommodation, TMs clear, nasal mucosa with moderate edema, slight erythema, no sinus drainage apparent  NECK: Neck supple. No adenopathy. Thyroid symmetric, normal size,,  PULMONARY: clear to auscultation  CARDIAC: regular rate and rhythm and no murmurs, clicks, or gallops  PULSES: 2/2 throughout  BACK: no spinal or CVAT  ABDOMINAL: Soft, nontender.  Normal bowel sounds.  No hepatosplenomegaly or abnormal masses  BREAST: No breast masses or tenderness, No axillary masses or tenderness and No galactorrhea  PELVIC: declined  REFLEXES: 2+ throughout     PHQ 7/24/2020 2/1/2022   PHQ-9 Total Score 15 12   Q9: Thoughts of better off dead/self-harm past 2 weeks Several days Not at all     JAYLYN-7 SCORE 7/24/2020   Total Score 13             ASSESSMENT/PLAN:   (Z00.00) Encounter for routine adult health examination without abnormal findings  (primary encounter diagnosis)  Comment: She declines Pap and vaccinations today.  Plan: Comprehensive metabolic panel (BMP + Alb, Alk         Phos, ALT, AST, Total. Bili, TP), CBC with         platelets and differential, Lipid panel reflex         to direct LDL Fasting            (F33.1) Moderate episode of recurrent major depressive disorder (H)  Comment: Discussed possible treatment versus counseling.  She declines both at this time, may consider online  Plan:     (E66.01) Morbid obesity (H)  Comment: We will check labs to rule out PCOS  Plan: Hemoglobin A1c, TSH with free T4 reflex        Work on diet and exercise    (F41.9) Anxiety  Comment: as above  Plan:     (R03.0) Elevated blood pressure reading without diagnosis of hypertension  Comment: Previous blood pressures have been normal  Plan: Consider outside checks    (N92.6) Irregular menstrual cycle  Comment: We will check labs  Plan: Hemoglobin A1c, Testosterone, total, 17 OH         progesterone, TSH with free T4 reflex            (R09.81) Nasal congestion  Comment: Advised  "over-the-counter use, see patient instructions  Plan: Consider ENT referral in the future    (L68.0) Hirsutism  Comment:   Plan: Hemoglobin A1c, Testosterone, total, 17 OH         progesterone, TSH with free T4 reflex            (R73.09) Blood glucose abnormal  Comment:   Plan: Hemoglobin A1c              Patient has been advised of split billing requirements and indicates understanding: Yes    COUNSELING:  Reviewed preventive health counseling, as reflected in patient instructions    Estimated body mass index is 57.39 kg/m  as calculated from the following:    Height as of 7/24/20: 1.6 m (5' 3\").    Weight as of 7/24/20: 147 kg (324 lb).    Weight management plan: Discussed healthy diet and exercise guidelines    She reports that she has never smoked. She has never used smokeless tobacco.      Counseling Resources:  ATP IV Guidelines  Pooled Cohorts Equation Calculator  Breast Cancer Risk Calculator  BRCA-Related Cancer Risk Assessment: FHS-7 Tool  FRAX Risk Assessment  ICSI Preventive Guidelines  Dietary Guidelines for Americans, 2010  [employer]'s MyPlate  ASA Prophylaxis  Lung CA Screening    Sneha Arriola MD  [facility]  Answers for HPI/ROS submitted by the patient on 2/1/2022  If you checked off any problems, how difficult have these problems made it for you to do your work, take care of things at home, or get along with other people?: Very difficult  PHQ9 TOTAL SCORE: 12      "

## 2022-02-01 NOTE — NURSING NOTE
"BP (!) 148/99 (BP Location: Right arm, Patient Position: Sitting, Cuff Size: Adult Regular)   Pulse 102   Temp 98.1  F (36.7  C) (Oral)   Resp 16   Ht 1.588 m (5' 2.5\")   Wt 149.6 kg (329 lb 12.8 oz)   LMP 11/26/2021   SpO2 98%   BMI 59.36 kg/m      "

## 2022-02-01 NOTE — PATIENT INSTRUCTIONS
You can try Flonase or Nasacort nasal steroid spray for blocked up nose.  It can take at least several  days to start noticing improvement.  You would start with 2 sprays in each nostril daily, after 10 to 14 days, if much better, you could cut down to 1 a day.

## 2022-02-02 ASSESSMENT — PATIENT HEALTH QUESTIONNAIRE - PHQ9: SUM OF ALL RESPONSES TO PHQ QUESTIONS 1-9: 12

## 2022-02-03 ENCOUNTER — TELEPHONE (OUTPATIENT)
Dept: INTERNAL MEDICINE | Facility: CLINIC | Age: 32
End: 2022-02-03
Payer: COMMERCIAL

## 2022-02-03 NOTE — TELEPHONE ENCOUNTER
Patient was seen in clinic on 2/1/22 and was told to schedule a fasting lab appointment for future labs.  No labs are ordered.  Please place orders, call patient once orders have been placed.  OK to leave a detailed voicemail message for patient.  ENMANUEL Jaramillo R.N.

## 2022-02-10 PROBLEM — F41.9 ANXIETY: Status: ACTIVE | Noted: 2022-02-10

## 2022-02-10 PROBLEM — F33.1 MODERATE EPISODE OF RECURRENT MAJOR DEPRESSIVE DISORDER (H): Status: ACTIVE | Noted: 2022-02-10

## 2022-02-17 ENCOUNTER — LAB (OUTPATIENT)
Dept: LAB | Facility: CLINIC | Age: 32
End: 2022-02-17
Payer: COMMERCIAL

## 2022-02-17 DIAGNOSIS — R73.09 BLOOD GLUCOSE ABNORMAL: ICD-10-CM

## 2022-02-17 DIAGNOSIS — N92.6 IRREGULAR MENSTRUAL CYCLE: ICD-10-CM

## 2022-02-17 DIAGNOSIS — Z00.00 ENCOUNTER FOR ROUTINE ADULT HEALTH EXAMINATION WITHOUT ABNORMAL FINDINGS: ICD-10-CM

## 2022-02-17 DIAGNOSIS — E66.01 MORBID OBESITY (H): ICD-10-CM

## 2022-02-17 DIAGNOSIS — L68.0 HIRSUTISM: ICD-10-CM

## 2022-02-17 LAB
ALBUMIN SERPL-MCNC: 3.4 G/DL (ref 3.4–5)
ALP SERPL-CCNC: 88 U/L (ref 40–150)
ALT SERPL W P-5'-P-CCNC: 27 U/L (ref 0–50)
ANION GAP SERPL CALCULATED.3IONS-SCNC: 7 MMOL/L (ref 3–14)
AST SERPL W P-5'-P-CCNC: 9 U/L (ref 0–45)
BASOPHILS # BLD AUTO: 0 10E3/UL (ref 0–0.2)
BASOPHILS NFR BLD AUTO: 0 %
BILIRUB SERPL-MCNC: 0.4 MG/DL (ref 0.2–1.3)
BUN SERPL-MCNC: 13 MG/DL (ref 7–30)
CALCIUM SERPL-MCNC: 9 MG/DL (ref 8.5–10.1)
CHLORIDE BLD-SCNC: 104 MMOL/L (ref 94–109)
CHOLEST SERPL-MCNC: 174 MG/DL
CO2 SERPL-SCNC: 25 MMOL/L (ref 20–32)
CREAT SERPL-MCNC: 0.78 MG/DL (ref 0.52–1.04)
EOSINOPHIL # BLD AUTO: 0 10E3/UL (ref 0–0.7)
EOSINOPHIL NFR BLD AUTO: 0 %
ERYTHROCYTE [DISTWIDTH] IN BLOOD BY AUTOMATED COUNT: 15.5 % (ref 10–15)
FASTING STATUS PATIENT QL REPORTED: YES
GFR SERPL CREATININE-BSD FRML MDRD: >90 ML/MIN/1.73M2
GLUCOSE BLD-MCNC: 89 MG/DL (ref 70–99)
HBA1C MFR BLD: 5.1 % (ref 0–5.6)
HCT VFR BLD AUTO: 38.9 % (ref 35–47)
HDLC SERPL-MCNC: 43 MG/DL
HGB BLD-MCNC: 12.5 G/DL (ref 11.7–15.7)
LDLC SERPL CALC-MCNC: 97 MG/DL
LYMPHOCYTES # BLD AUTO: 3.8 10E3/UL (ref 0.8–5.3)
LYMPHOCYTES NFR BLD AUTO: 32 %
MCH RBC QN AUTO: 24.4 PG (ref 26.5–33)
MCHC RBC AUTO-ENTMCNC: 32.1 G/DL (ref 31.5–36.5)
MCV RBC AUTO: 76 FL (ref 78–100)
MONOCYTES # BLD AUTO: 0.7 10E3/UL (ref 0–1.3)
MONOCYTES NFR BLD AUTO: 6 %
NEUTROPHILS # BLD AUTO: 7.2 10E3/UL (ref 1.6–8.3)
NEUTROPHILS NFR BLD AUTO: 61 %
NONHDLC SERPL-MCNC: 131 MG/DL
PLATELET # BLD AUTO: 335 10E3/UL (ref 150–450)
POTASSIUM BLD-SCNC: 4.2 MMOL/L (ref 3.4–5.3)
PROT SERPL-MCNC: 7.8 G/DL (ref 6.8–8.8)
RBC # BLD AUTO: 5.12 10E6/UL (ref 3.8–5.2)
SODIUM SERPL-SCNC: 136 MMOL/L (ref 133–144)
T4 FREE SERPL-MCNC: 1.01 NG/DL (ref 0.76–1.46)
TRIGL SERPL-MCNC: 170 MG/DL
TSH SERPL DL<=0.005 MIU/L-ACNC: 7.1 MU/L (ref 0.4–4)
WBC # BLD AUTO: 11.8 10E3/UL (ref 4–11)

## 2022-02-17 PROCEDURE — 80061 LIPID PANEL: CPT

## 2022-02-17 PROCEDURE — 84403 ASSAY OF TOTAL TESTOSTERONE: CPT

## 2022-02-17 PROCEDURE — 84443 ASSAY THYROID STIM HORMONE: CPT

## 2022-02-17 PROCEDURE — 85025 COMPLETE CBC W/AUTO DIFF WBC: CPT

## 2022-02-17 PROCEDURE — 84439 ASSAY OF FREE THYROXINE: CPT

## 2022-02-17 PROCEDURE — 36415 COLL VENOUS BLD VENIPUNCTURE: CPT

## 2022-02-17 PROCEDURE — 80053 COMPREHEN METABOLIC PANEL: CPT

## 2022-02-17 PROCEDURE — 83498 ASY HYDROXYPROGESTERONE 17-D: CPT

## 2022-02-17 PROCEDURE — 83036 HEMOGLOBIN GLYCOSYLATED A1C: CPT

## 2022-02-21 LAB
17OHP SERPL-MCNC: 33 NG/DL
TESTOST SERPL-MCNC: 20 NG/DL (ref 8–60)

## 2022-03-30 ENCOUNTER — TRANSFERRED RECORDS (OUTPATIENT)
Dept: HEALTH INFORMATION MANAGEMENT | Facility: CLINIC | Age: 32
End: 2022-03-30
Payer: COMMERCIAL

## 2022-04-04 ENCOUNTER — LAB (OUTPATIENT)
Dept: LAB | Facility: CLINIC | Age: 32
End: 2022-04-04
Payer: COMMERCIAL

## 2022-04-04 DIAGNOSIS — R94.6 THYROID FUNCTION TEST ABNORMAL: ICD-10-CM

## 2022-04-04 DIAGNOSIS — D72.829 LEUKOCYTOSIS, UNSPECIFIED TYPE: ICD-10-CM

## 2022-04-04 DIAGNOSIS — R71.8 LOW MEAN CORPUSCULAR VOLUME (MCV): ICD-10-CM

## 2022-04-04 LAB
BASOPHILS # BLD AUTO: 0 10E3/UL (ref 0–0.2)
BASOPHILS NFR BLD AUTO: 0 %
EOSINOPHIL # BLD AUTO: 0 10E3/UL (ref 0–0.7)
EOSINOPHIL NFR BLD AUTO: 0 %
ERYTHROCYTE [DISTWIDTH] IN BLOOD BY AUTOMATED COUNT: 15.8 % (ref 10–15)
HCT VFR BLD AUTO: 39.6 % (ref 35–47)
HGB BLD-MCNC: 12.7 G/DL (ref 11.7–15.7)
LYMPHOCYTES # BLD AUTO: 2.4 10E3/UL (ref 0.8–5.3)
LYMPHOCYTES NFR BLD AUTO: 29 %
MCH RBC QN AUTO: 24.7 PG (ref 26.5–33)
MCHC RBC AUTO-ENTMCNC: 32.1 G/DL (ref 31.5–36.5)
MCV RBC AUTO: 77 FL (ref 78–100)
MONOCYTES # BLD AUTO: 0.4 10E3/UL (ref 0–1.3)
MONOCYTES NFR BLD AUTO: 5 %
NEUTROPHILS # BLD AUTO: 5.6 10E3/UL (ref 1.6–8.3)
NEUTROPHILS NFR BLD AUTO: 66 %
PLATELET # BLD AUTO: 323 10E3/UL (ref 150–450)
RBC # BLD AUTO: 5.14 10E6/UL (ref 3.8–5.2)
WBC # BLD AUTO: 8.4 10E3/UL (ref 4–11)

## 2022-04-04 PROCEDURE — 85025 COMPLETE CBC W/AUTO DIFF WBC: CPT

## 2022-04-04 PROCEDURE — 36415 COLL VENOUS BLD VENIPUNCTURE: CPT

## 2022-04-04 PROCEDURE — 84443 ASSAY THYROID STIM HORMONE: CPT

## 2022-04-04 PROCEDURE — 83550 IRON BINDING TEST: CPT

## 2022-04-05 LAB
IRON SATN MFR SERPL: 12 % (ref 15–46)
IRON SERPL-MCNC: 39 UG/DL (ref 35–180)
TIBC SERPL-MCNC: 339 UG/DL (ref 240–430)
TSH SERPL DL<=0.005 MIU/L-ACNC: 2.38 MU/L (ref 0.4–4)

## 2022-08-22 ENCOUNTER — NURSE TRIAGE (OUTPATIENT)
Dept: INTERNAL MEDICINE | Facility: CLINIC | Age: 32
End: 2022-08-22

## 2022-08-22 NOTE — TELEPHONE ENCOUNTER
"Routing to pcp and triage team.   Situation     Patient calls asking what her pcp thinks she should do to follow up.     background   [facility]  last Friday 8/19/22 for numbness \" all over her body\"     Per patient  on Friday talked to their neurologist and they said due to the absence of red flags -like headache, weakness or injury they suggest an MRI rather than a CT and neuro follow        Tuesday 8/16 evening last week the numbness started on the whole Right side of body, she felt a sharp diving line between the right and left side of her body.      a couple days before that the numbness started in her right arm  Patient had a stiff neck 1.5 weeks ago for one day     She does not have insurance right now but applied for MA    Assessment   Patient goes from crying to talking normally through out phone call     Since yesterday she feels like she has reduced sensation everywhere head to toe (total body). It does not change, \"just stays numb\"  No chest pain, no palpations  No SOB  No new medications  No headache   No vision issues   No dizziness  No balance issues   No speech or swallowing trouble   No weakness in extremities. \"I am not noticing any weakness no muscle drooping\"   no pregnancy, currently has her period.   Can bear weight on legs, can lift things normally with arms/hands    no fever     Patient is wondering if she should come in the office, do you suggest the MRI or should she just go to see neurology, please advise.?     Per protocol stated come in within 3 days.   Reason for Disposition    Numbness or tingling on both sides of body and is a new symptom lasting > 24 hours    Additional Information    Negative: Difficult to awaken or acting confused (e.g., disoriented, slurred speech)    Negative: New neurologic deficit that is present NOW, sudden onset of ANY of the following: * Weakness of the face, arm, or leg on one side of the body* Numbness of the face, arm, or leg on one side of the " body* Loss of speech or garbled speech    Negative: Sounds like a life-threatening emergency to the triager    Negative: Confusion, disorientation, or hallucinations is main symptom    Negative: Dizziness is main symptom    Negative: Followed a head injury within last 3 days    Negative: Headache (with neurologic deficit)    Negative: Unable to urinate (or only a few drops) and bladder feels very full    Negative: Loss of bladder or bowel control (urine or bowel incontinence; wetting self, leaking stool) of new-onset    Negative: Back pain with numbness (loss of sensation) in groin or rectal area    Negative: Neurologic deficit that was brief (now gone), ANY of the following: * Weakness of the face, arm, or leg on one side of the body * Numbness of the face, arm, or leg on one side of the body * Loss of speech or garbled speech     Numbness is on both sides    Negative: Patient sounds very sick or weak to the triager    Negative: Tingling (e.g., pins and needles) of the face, arm or leg on one side of the body, that is present now (Exceptions: Chronic or recurrent symptom lasting > 4 weeks; or tingling from known cause, such as: bumped elbow, carpal tunnel syndrome, pinched nerve, frostbite.)     Brief tingle/bee sting feeling on her back, hand and arm. Last experienced last night    Negative: [place] palsy suspected (i.e., weakness only one side of the face, developing over hours to days, no other symptoms)    Negative: Neck pain (with neurologic deficit)    Negative: Back pain (with neurologic deficit)    Negative: Loss of speech or garbled speech is a chronic symptom (recurrent or ongoing problem lasting > 4 weeks)    Negative: Weakness of arm or leg is a chronic symptom (recurrent or ongoing problem lasting > 4 weeks)    Negative: Numbness or tingling in one or both hands is a chronic symptom (recurrent or ongoing problem lasting > 4 weeks)    Negative: Numbness or tingling in one or both feet is a chronic symptom  (recurrent or ongoing problem lasting > 4 weeks)    Protocols used: NEUROLOGIC DEFICIT-A-[place]    Recommendations  Routing to provider for review and recommendations  See care advice  Worsening symptoms, red flag symptoms-ed    Patient agreed to plan     Valerie LUTZ RN   [facility] Triage

## 2022-08-23 ENCOUNTER — HOSPITAL ENCOUNTER (EMERGENCY)
Facility: CLINIC | Age: 32
Discharge: HOME | End: 2022-08-23
Attending: EMERGENCY MEDICINE | Admitting: EMERGENCY MEDICINE
Payer: MEDICAID

## 2022-08-23 ENCOUNTER — APPOINTMENT (OUTPATIENT)
Dept: GENERAL RADIOLOGY | Facility: CLINIC | Age: 32
End: 2022-08-23
Attending: EMERGENCY MEDICINE
Payer: MEDICAID

## 2022-08-23 VITALS
OXYGEN SATURATION: 97 % | DIASTOLIC BLOOD PRESSURE: 107 MMHG | SYSTOLIC BLOOD PRESSURE: 160 MMHG | TEMPERATURE: 97.6 F | HEART RATE: 108 BPM | RESPIRATION RATE: 20 BRPM

## 2022-08-23 DIAGNOSIS — F41.9 ANXIETY: ICD-10-CM

## 2022-08-23 LAB
ALBUMIN SERPL BCG-MCNC: 4.2 G/DL (ref 3.5–5.2)
ALP SERPL-CCNC: 88 U/L (ref 35–104)
ALT SERPL W P-5'-P-CCNC: 17 U/L (ref 10–35)
ANION GAP SERPL CALCULATED.3IONS-SCNC: 15 MMOL/L (ref 7–15)
AST SERPL W P-5'-P-CCNC: 20 U/L (ref 10–35)
BASE EXCESS BLDV CALC-SCNC: 1.1 MMOL/L (ref -7.7–1.9)
BASOPHILS # BLD AUTO: 0 10E3/UL (ref 0–0.2)
BASOPHILS NFR BLD AUTO: 0 %
BILIRUB SERPL-MCNC: 0.4 MG/DL
BUN SERPL-MCNC: 9.8 MG/DL (ref 6–20)
CALCIUM SERPL-MCNC: 9.4 MG/DL (ref 8.6–10)
CHLORIDE SERPL-SCNC: 101 MMOL/L (ref 98–107)
CREAT SERPL-MCNC: 0.78 MG/DL (ref 0.51–0.95)
D DIMER PPP FEU-MCNC: <0.27 UG/ML FEU (ref 0–0.5)
DEPRECATED HCO3 PLAS-SCNC: 24 MMOL/L (ref 22–29)
EOSINOPHIL # BLD AUTO: 0 10E3/UL (ref 0–0.7)
EOSINOPHIL NFR BLD AUTO: 0 %
ERYTHROCYTE [DISTWIDTH] IN BLOOD BY AUTOMATED COUNT: 14.7 % (ref 10–15)
GFR SERPL CREATININE-BSD FRML MDRD: >90 ML/MIN/1.73M2
GLUCOSE SERPL-MCNC: 90 MG/DL (ref 70–99)
HCG SER QL IA.RAPID: NEGATIVE
HCO3 BLDV-SCNC: 26 MMOL/L (ref 21–28)
HCT VFR BLD AUTO: 39.8 % (ref 35–47)
HGB BLD-MCNC: 12.4 G/DL (ref 11.7–15.7)
HOLD SPECIMEN: NORMAL
IMM GRANULOCYTES # BLD: 0 10E3/UL
IMM GRANULOCYTES NFR BLD: 0 %
LYMPHOCYTES # BLD AUTO: 2.1 10E3/UL (ref 0.8–5.3)
LYMPHOCYTES NFR BLD AUTO: 20 %
MCH RBC QN AUTO: 23.8 PG (ref 26.5–33)
MCHC RBC AUTO-ENTMCNC: 31.2 G/DL (ref 31.5–36.5)
MCV RBC AUTO: 77 FL (ref 78–100)
MONOCYTES # BLD AUTO: 0.5 10E3/UL (ref 0–1.3)
MONOCYTES NFR BLD AUTO: 5 %
NEUTROPHILS # BLD AUTO: 7.9 10E3/UL (ref 1.6–8.3)
NEUTROPHILS NFR BLD AUTO: 75 %
NRBC # BLD AUTO: 0 10E3/UL
NRBC BLD AUTO-RTO: 0 /100
O2/TOTAL GAS SETTING VFR VENT: 0 %
OXYHGB MFR BLDV: 56 % (ref 70–75)
PCO2 BLDV: 42 MM HG (ref 40–50)
PH BLDV: 7.4 [PH] (ref 7.32–7.43)
PLATELET # BLD AUTO: 359 10E3/UL (ref 150–450)
PO2 BLDV: 31 MM HG (ref 25–47)
POTASSIUM SERPL-SCNC: 4.2 MMOL/L (ref 3.4–5.3)
PROT SERPL-MCNC: 8 G/DL (ref 6.4–8.3)
RBC # BLD AUTO: 5.2 10E6/UL (ref 3.8–5.2)
SODIUM SERPL-SCNC: 140 MMOL/L (ref 136–145)
TROPONIN T SERPL HS-MCNC: <6 NG/L
WBC # BLD AUTO: 10.6 10E3/UL (ref 4–11)

## 2022-08-23 PROCEDURE — 84702 CHORIONIC GONADOTROPIN TEST: CPT

## 2022-08-23 PROCEDURE — 250N000011 HC RX IP 250 OP 636: Performed by: EMERGENCY MEDICINE

## 2022-08-23 PROCEDURE — 99285 EMERGENCY DEPT VISIT HI MDM: CPT | Mod: 25

## 2022-08-23 PROCEDURE — 84484 ASSAY OF TROPONIN QUANT: CPT | Performed by: EMERGENCY MEDICINE

## 2022-08-23 PROCEDURE — 90791 PSYCH DIAGNOSTIC EVALUATION: CPT

## 2022-08-23 PROCEDURE — 96374 THER/PROPH/DIAG INJ IV PUSH: CPT

## 2022-08-23 PROCEDURE — 71046 X-RAY EXAM CHEST 2 VIEWS: CPT

## 2022-08-23 PROCEDURE — 80053 COMPREHEN METABOLIC PANEL: CPT | Performed by: EMERGENCY MEDICINE

## 2022-08-23 PROCEDURE — 36415 COLL VENOUS BLD VENIPUNCTURE: CPT | Performed by: EMERGENCY MEDICINE

## 2022-08-23 PROCEDURE — 82805 BLOOD GASES W/O2 SATURATION: CPT | Performed by: EMERGENCY MEDICINE

## 2022-08-23 PROCEDURE — 85025 COMPLETE CBC W/AUTO DIFF WBC: CPT | Performed by: EMERGENCY MEDICINE

## 2022-08-23 PROCEDURE — 93005 ELECTROCARDIOGRAM TRACING: CPT

## 2022-08-23 PROCEDURE — 85379 FIBRIN DEGRADATION QUANT: CPT | Performed by: EMERGENCY MEDICINE

## 2022-08-23 RX ORDER — LORAZEPAM 2 MG/ML
0.5 INJECTION INTRAMUSCULAR ONCE
Status: COMPLETED | OUTPATIENT
Start: 2022-08-23 | End: 2022-08-23

## 2022-08-23 RX ORDER — LORAZEPAM 0.5 MG/1
.5-1 TABLET ORAL EVERY 8 HOURS PRN
Qty: 4 TABLET | Refills: 0 | Status: SHIPPED | OUTPATIENT
Start: 2022-08-23 | End: 2023-12-20

## 2022-08-23 RX ADMIN — LORAZEPAM 0.5 MG: 2 INJECTION INTRAMUSCULAR; INTRAVENOUS at 13:12

## 2022-08-23 ASSESSMENT — ACTIVITIES OF DAILY LIVING (ADL)
ADLS_ACUITY_SCORE: 35

## 2022-08-23 ASSESSMENT — ENCOUNTER SYMPTOMS
CHEST TIGHTNESS: 1
SHORTNESS OF BREATH: 1
NUMBNESS: 1

## 2022-08-23 NOTE — ED NOTES
Patient was endorsed to me at end of shift by Dr. De Jesus.  Briefly, she presented with various complaints including shortness of breath and chest pain as well as some paresthesias.  Her work-up is unremarkable at this time.  D-dimer and troponin normal.  EKG and chest x-ray are negative.  Dr. De Jesus felt that patient was safe to go home from that standpoint.  He is concerned about her severe anxiety and possibly some psychosomatic symptoms related to this.  Therefore he had [facility] assess her and was waiting on their input prior to discharge.  [facility] did assess the patient and they do not feel that she needs hospitalization at this time.  They did offer to arrange some outpatient follow-up but patient is going to hold off on this for the time being.  They did clear her for discharge so I updated the patient on their recommendations and we will discharge her at this time.  Recommend close primary care follow-up and discussed return precautions.     Zion Mercer MD  08/23/22 [id number]

## 2022-08-23 NOTE — TELEPHONE ENCOUNTER
Call to pt and advised. She started crying over the phone. She doesn't have a neurologist, it has been 2 years since she saw the last one. She needs new referral.   She states she will need to go back to . She cannot wait for appt. She states she applied for Medical assistance but doesn't have current insurance.    Advised her if having severe symptoms, may need to go to ED. She states she will have to think about it.     Please advise for referral. OK to mail to pt.

## 2022-08-23 NOTE — CONSULTS
"Diagnostic Evaluation Consultation  Crisis Assessment    Patient was assessed: [facility]  Patient location: [facility], [place]   Was a release of information signed: Yes. Providers included on the release: signed RAMILA to DEC        Referral Data and Chief Complaint  Iris is a 32 year old, who uses she/her pronouns, and presents to the ED with family/friends. Patient is referred to the ED by family/friends. Patient is presenting to the ED for the following concerns: Iris states that she came into the ER for \"whole body numbness\" that she was experiencing and was concerned. Prior history of anxiety, depression, autism spectrum, ADHD per pt report. States that she feels anxious and \"somewhat\" depressed today but states that this is \"baseline for me so I don't think that this is being caused by anxiety or depression\".     Informed Consent and Assessment Methods     Patient is her own guardian. Writer met with patient and explained the crisis assessment process, including applicable information disclosures and limits to confidentiality, assessed understanding of the process, and obtained consent to proceed with the assessment. Patient was observed to be able to participate in the assessment as evidenced by explained assessment process to patient and she verbalized understanding of the assessment process and gave informed consent. . Assessment methods included conducting a formal interview with patient, review of medical records, collaboration with medical staff, and obtaining relevant collateral information from family and community providers when available..     Over the course of this crisis assessment provided reassurance, offered validation and engaged patient in problem solving and disposition planning. Patient's response to interventions was: Iris was calm, cooperative and talkative during interview. She had some insight into her condition and how her physical symptoms might " "be manifesting some hidden and unacknowledged depression and anxiety.      Summary of Patient Situation    Reports that she was in urgent care yesterday with similar sx and they told her to come back if sx worsened which they have.     Brief Psychosocial History    Pt is single. No children. Lives in a house with her parents, rents a room from them. Has been unemployed for approx 2 years. 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     Significant Clinical History     Prior mental health dx of depression, anxiety, ADHD, autism spectrum. Pt reports that she started seeing a therapist at age 12. Has been seeing a therapist fairly consistently over the years. Last saw therapist in 2019 when she reports that the therapist \"graduated\" her and recommended DBT which she has tried in the past and \"hated it\" per pt report. Reports that she was on Adderall for ADHD in the past but nothing recent. Was also on Wellbutrin about 2 years ago and felt this was helpful. Does not currently have insurance and this is a source of stress. No current providers. Has never been inpatient hospital stay before.        Collateral Information  Pt's mother was present in room for assessment with pt's permission. Mother felt that pt had actually been fairly stable up to about 2 days ago when the numbness seemed to come out of nowwhere. Mother reports that she has had her own health problems and felt nervous about pt's sx that brought her into the ER. Denied concerns about pt discharging back home today.        Risk Assessment  ESS-6  1.a. Over the past 2 weeks, have you had thoughts of killing yourself? No  1.b. Have you ever attempted to kill yourself and, if yes, when did this last happen? No   2. Recent or " current suicide plan? No   3. Recent or current intent to act on ideation? No  4. Lifetime psychiatric hospitalization? No  5. Pattern of excessive substance use? No  6. Current irritability, agitation, or aggression? No  Scoring note: BOTH 1a and 1b must be yes for it to score 1 point, if both are not yes it is zero. All others are 1 point per number. If all questions 1a/1b - 6 are no, risk is negligible. If one of 1a/1b is yes, then risk is mild. If either question 2 or 3, but not both, is yes, then risk is automatically moderate regardless of total score. If both 2 and 3 are yes, risk is automatically high regardless of total score.      Score: 0, mild risk      Does the patient have access to lethal means? No     Does the patient engage in non-suicidal self-injurious behavior (NSSI/SIB)? no     Does the patient have thoughts of harming others? No     Is the patient engaging in sexually inappropriate behavior?  no        Current Substance Abuse     Is there recent substance abuse? no     Was a urine drug screen or blood alcohol level obtained: No       Mental Status Exam     Affect: Appropriate   Appearance: Appropriate    Attention Span/Concentration: Attentive  Eye Contact: Variable   Fund of Knowledge: Appropriate    Language /Speech Content: Fluent   Language /Speech Volume: Normal    Language /Speech Rate/Productions: Articulate    Recent Memory: Intact   Remote Memory: Intact   Mood: Normal and Sad    Orientation to Person: Yes    Orientation to Place: Yes   Orientation to Time of Day: Yes    Orientation to Date: Yes    Situation (Do they understand why they are here?): Yes    Psychomotor Behavior: normal    Thought Content: Clear   Thought Form: Goal Directed and Intact      History of commitment: No      Medication    Psychotropic medications: No current medications but a history of wellbutrin and adderral .  Medication changes made in the last two weeks: No       Current Care Team    No current  providers. Applied for medicaid about a week ago and application is pending.     Diagnosis    300.02 (F41.1) Generalized Anxiety Disorder   296.22 (F32.1)  Major Depressive Disorder, Single Episode, Moderate _ and With anxious distress - primary and - by history      Clinical Summary and Substantiation of Recommendations    Pt denied suicidal thoughts, plan or intent. Was cooperative in plan for safety at discharge. Gave pt recommendation to follow up with outpatient therapy and she was agreeable. Requested to take info and set up own appt when her insurance is approved.   Disposition    Recommended disposition: Individual Therapy       Reviewed case and recommendations with attending provider. Attending Name: Dr Mercer        Attending concurs with disposition: Yes       Patient concurs with disposition: Yes       Guardian concurs with disposition: NA      Final disposition: Individual therapy .     Outpatient Details (if applicable):   Aftercare plan and appointments placed in the AVS and provided to patient: No. Rationale: pt waiting for MA to be approved. will set up own appts when able     Was lethal means counseling provided as a part of aftercare planning? No;       Assessment Details    Patient interview started at: 1620 and completed at: 1710.     Total duration spent on the patient case in minutes: 1.50 hrs      CPT code(s) utilized: 82277 - Psychotherapy for Crisis - 60 (30-74*) min       Angelia Edward MSW, [facility]  DEC - Triage & Transition Services      Aftercare Plan  If I am feeling unsafe or I am in a crisis, I will:   Contact my established care providers   Call the National Suicide Prevention Lifeline: 988  Go to the nearest emergency room   Call 911     Warning signs that I or other people might notice when a crisis is developing for me: increased depression, anxiety, thoughts of self harm or suicidal thoughts     Things I am able to do on my own to cope or help me feel better: watch  "movies/TV, be on the computer, go to Islam/pray     Things that I am able to do with others to cope or help me better: talk openly and honestly about how I am feeling     Things I can use or do for distraction: take a hot bath or cold shower, journal, color, watch a favorite movie or TV show     Changes I can make to support my mental health and wellness: consider seeing a new therapist to discuss mental health concerns      People in my life that I can ask for help: mom, dad, friends    Your [facility] has a mental health crisis team you can call 24/7: [facility] Crisis  [phone]      Crisis Lines  Crisis Text Line  Text 401162  You will be connected with a trained live crisis counselor to provide support.    Por espanol, texto  GUY a 825705 o texto a 442-AYUDAME en WhatsApp    The [employer] (LGBTQ Youth Crisis Line)  7.862.361.5346  text START to 821-416      [employer]  Fast Tracker  Linking people to mental health and substance use disorder resources  [employer]     [place] Mental Health Warm Line  Peer to peer support  Monday thru Saturday, 12 pm to 10 pm  600.176.4322 or 1.684.935.8483  Text \"Support\" to 10944    National [place] on Mental Illness (ANUSHA)  671.330.6078 or 1.888.ANUSHA.HELPS      Mental Health Apps  My3  https://my3app.org/    VirtualHopeBox  https://Tappx.org/apps/virtual-hope-box/      Additional Information  Today you were seen by a licensed mental health professional through Triage and Transition services, Behavioral Healthcare Providers ([facility])  for a crisis assessment in the Emergency Department at [facility].  It is recommended that you follow up with your established providers (psychiatrist, mental health therapist, and/or primary care doctor - as relevant) as soon as possible. Coordinators from [facility] will be calling you in the next 24-48 hours to ensure that you have the resources you need.  You can also contact [facility] coordinators " directly at [phone]. You may have been scheduled for or offered an appointment with a mental health provider. [facility] maintains an extensive network of licensed behavioral health providers to connect patients with the services they need.  We do not charge providers a fee to participate in our referral network.  We match patients with providers based on a patient's specific needs, insurance coverage, and location.  Our first effort will be to refer you to a provider within your care system, and will utilize providers outside your care system as needed.

## 2022-08-23 NOTE — ED PROVIDER NOTES
"  History   Chief Complaint:  Full Body Numbness     The history is provided by the patient.      Iris Toscano is a 32 year old female with history of anxiety, depression, autism spectrum disorder, and ADHD who presents with whole body numbness of \"varying degrees\" starting yesterday which started after she experienced previous right side only numbness (since three days ago). She describes the numbness as reduced sensation. She notes her chest \"feels tight but she cannot feel it.\" Her biggest concern is that she will \"stop breathing during the night and not feel it.\" She notes shortness of breath with exertion (believes this is due to obesity). Patient denies chest pain and trouble breathing. Patient states she has meralgia paresthetica in her left thigh. She notes her mood the past couple weeks has been \"weird\"; she is anxious and depressed which she states is normal for her. She denies having asthma and COPD. She denies taking medications. She denies using drugs or drinking alcohol. She adds she has not been seen in therapy since 2019.    Review of Systems   Respiratory: Positive for chest tightness and shortness of breath (With exertion (baseline)).    Cardiovascular: Negative for chest pain.   Neurological: Positive for numbness.   All other systems reviewed and are negative.      Allergies:  The patient has no known allergies.     Medications:  Lamotrigine    Past Medical History:     Asperger's disease  Morbid obesity  Moderate episode of recurrent major depressive disorder  Anxiety  Depressive disorder  ADHD  Oppositional defiant disorder     Family History:    Mother- breast cancer, leukemia, lipids, thyroid disease  Father- lipids, thyroid disease    Social History:  PCP: Sneha Arriola   Patient presents with mom  Entered by car  Nonsmoker  Does not drink alcohol    Physical Exam     Patient Vitals for the past 24 hrs:   BP Temp Temp src Pulse Resp SpO2   08/23/22 1450 -- -- -- -- -- 95 %   08/23/22 1445 " -- -- -- -- -- 96 %   08/23/22 1304 -- -- -- -- -- 96 %   08/23/22 1159 (!) 214/114 97.6  F (36.4  C) Temporal 112 22 98 %       Physical Exam  Constitutional: Alert, attentive, GCS 15  HENT:    Nose: Nose normal.    Mouth/Throat: Oropharynx is clear, mucous membranes are moist  Eyes: EOM are normal, anicteric, conjugate gaze  CV: regular rate and rhythm; no murmurs  Chest: Effort normal and breath sounds clear without wheezing or rales, symmetric bilaterally   GI:  non tender. No distension. No guarding or rebound.    MSK: No LE edema, no tenderness to palpation of BLE.  Neurological:   GCS 15; A/Ox3; Cranial nerves 2-12 intact;   5/5 strength throughout the upper and lower extremities;   sensation intact to light touch throughout the upper and lower extremities;   2+ DTRs to the bilateral upper and lower extremities (biceps, BRs, patellar, achilles);   normal fine motor coordination intact bilaterally;   normal gait   Skin: Skin is warm and dry.    Emergency Department Course   ECG  ECG taken at 1206, ECG read at 1211  Sinus rhythm with marked sinus arrhythmia   Rate 85 bpm. SD interval 164 ms. QRS duration 90 ms. QT/QTc 368/437 ms. P-R-T axes 59 72 10.     Imaging:  Chest XR,  PA & LAT   Final Result   IMPRESSION: No acute cardiopulmonary disease.      NAVIN MCNEAL MD            SYSTEM ID:  [id number]        Report per radiology    Laboratory:  Labs Ordered and Resulted from Time of ED Arrival to Time of ED Departure   CBC WITH PLATELETS AND DIFFERENTIAL - Abnormal       Result Value    WBC Count 10.6      RBC Count 5.20      Hemoglobin 12.4      Hematocrit 39.8      MCV 77 (*)     MCH 23.8 (*)     MCHC 31.2 (*)     RDW 14.7      Platelet Count 359      % Neutrophils 75      % Lymphocytes 20      % Monocytes 5      % Eosinophils 0      % Basophils 0      % Immature Granulocytes 0      NRBCs per 100 WBC 0      Absolute Neutrophils 7.9      Absolute Lymphocytes 2.1      Absolute Monocytes 0.5      Absolute  Eosinophils 0.0      Absolute Basophils 0.0      Absolute Immature Granulocytes 0.0      Absolute NRBCs 0.0     BLOOD GAS VENOUS WITH OXYHEMOGLOBIN - Abnormal    pH Venous 7.40      pCO2 Venous 42      pO2 Venous 31      Bicarbonate Venous 26      FIO2 0      Oxyhemoglobin Venous 56 (*)     Base Excess/Deficit (+/-) 1.1     COMPREHENSIVE METABOLIC PANEL - Normal    Sodium 140      Potassium 4.2      Creatinine 0.78      Urea Nitrogen 9.8      Chloride 101      Carbon Dioxide (CO2) 24      Anion Gap 15      Glucose 90      Calcium 9.4      Protein Total 8.0      Albumin 4.2      Bilirubin Total 0.4      Alkaline Phosphatase 88      AST 20      ALT 17      GFR Estimate >90     TROPONIN T, HIGH SENSITIVITY - Normal    Troponin T, High Sensitivity <6     D DIMER QUANTITATIVE - Normal    D-Dimer Quantitative <0.27     ISTAT HCG QUALITATIVE PREGNANCY POCT - Normal    HCG Qualitative POCT Negative        Emergency Department Course:     Reviewed:  I reviewed nursing notes, vitals, past medical history and Care Everywhere      Consults:  DEC    Interventions:  Medications   LORazepam (ATIVAN) injection 0.5 mg (0.5 mg Intravenous Given 22 1312)         Disposition:  The patient was discharged to home.     Impression & Plan     Medical Decision Makin-year-old past medical history sniffing for depression, anxiety, autism spectrum disorder, ADHD who presents with whole body numbness that started on the right side and then progressed to the left going from her head to the tip of her toes with no associated vision changes, weakness or pain.  She is hyperverbal with pressured speech, tearful and worried about life-threatening or life limiting etiologies and has been googling them.  It is clear from her presentation that anxiety, for which she has not seen anyone since 2019 is a major contributing factor, I did review with her that her symptoms do not lateralize to the location of the brain or spinal cord, I do not see  any indication for neuroimaging as she has a nonlocalizing exam.  She did endorse some shortness of breath, though described more as difficulty feeling if she is breathing.  Her VBG, chest x-ray, D-dimer are all unremarkable.  After dose of Ativan, she did report her symptoms significantly improved, she was amenable to seeing DEC, pending their recommendations, patient signed out to Dr. Mercer, anticipate discharge home with outpatient therapies.  I will recommend PCP follow-up for her somatic complaints.    Diagnosis:    ICD-10-CM    1. Anxiety  F41.9        Discharge Medications:  New Prescriptions    LORAZEPAM (ATIVAN) 0.5 MG TABLET    Take 1-2 tablets (0.5-1 mg) by mouth every 8 hours as needed for anxiety     Brian De Jesus MD  [employer]  4:52 PM 08/23/22       Scribe Disclosure:  I, Beulah Briceno, am serving as a scribe at 12:40 PM on 8/23/2022 to document services personally performed by Brian De Jesus MD based on my observations and the provider's statements to me.            Brian De Jesus MD  08/23/22 5909

## 2022-08-23 NOTE — ED TRIAGE NOTES
Pt with chest pressure and exertional SOB since yesterday. No recent travel, calf pain, or birth control use. ABC intact.

## 2022-08-23 NOTE — DISCHARGE INSTRUCTIONS
"Aftercare Plan  If I am feeling unsafe or I am in a crisis, I will:   Contact my established care providers   Call the National Suicide Prevention Lifeline: 988  Go to the nearest emergency room   Call 911     Warning signs that I or other people might notice when a crisis is developing for me: increased depression, anxiety, thoughts of self harm or suicidal thoughts     Things I am able to do on my own to cope or help me feel better: watch movies/TV, be on the computer, go to Worship/pray     Things that I am able to do with others to cope or help me better: talk openly and honestly about how I am feeling     Things I can use or do for distraction: take a hot bath or cold shower, journal, color, watch a favorite movie or TV show     Changes I can make to support my mental health and wellness: consider seeing a new therapist to discuss mental health concerns      People in my life that I can ask for help: mom, dad, friends    Your Counts include 234 beds at the [facility] has a mental health crisis team you can call 24/7: [facility] Crisis  [phone]      Crisis Lines  Crisis Text Line  Text 527079  You will be connected with a trained live crisis counselor to provide support.    Por espanol, texto  GUY a 810900 o texto a 442-AYUDAME en WhatsApp    The [employer] Project (LGBTQ Youth Crisis Line)  6.373.068.1971  text START to 245-180      Community Resources  Fast Tracker  Linking people to mental health and substance use disorder resources  fasttrackermn.org     [place] Mental Health Warm Line  Peer to peer support  Monday thru Saturday, 12 pm to 10 pm  421.355.1577 or 9.966.774.1630  Text \"Support\" to 73546    National [place] on Mental Illness (ANUSHA)  198.360.9139 or 1.888.ANUSHA.HELPS      Mental Health Apps  My3  https://my3app.org/    VirtualHopeBox  https://[employer].org/apps/virtual-hope-box/      Additional Information  Today you were seen by a licensed mental health professional through Triage and Transition services, " Behavior[facility] Healthcare Providers ([facility])  for a crisis assessment in the Emergency Department at [facility].  It is recommended that you follow up with your established providers (psychiatrist, mental health therapist, and/or primary care doctor - as relevant) as soon as possible. Coordinators from [facility] will be calling you in the next 24-48 hours to ensure that you have the resources you need.  You can also contact [facility] coordinators directly at [phone]. You may have been scheduled for or offered an appointment with a mental health provider. [facility] maintains an extensive network of licensed behavioral health providers to connect patients with the services they need.  We do not charge providers a fee to participate in our referral network.  We match patients with providers based on a patient's specific needs, insurance coverage, and location.  Our first effort will be to refer you to a provider within your care system, and will utilize providers outside your care system as needed.              Therapist recommendations in your area:     [facility]  [address]  [place], [place]   [phone]    Nicanor Cordova  [facility]  [address]  [place], [place]  [phone]    Ana Laura Quiroga or Janessa Jones   [facility]   [address]  [address]  [place], [place]  [phone]

## 2022-08-24 LAB
ATRIAL RATE - MUSE: 96 BPM
DIASTOLIC BLOOD PRESSURE - MUSE: NORMAL MMHG
INTERPRETATION ECG - MUSE: NORMAL
P AXIS - MUSE: 59 DEGREES
PR INTERVAL - MUSE: 164 MS
QRS DURATION - MUSE: 90 MS
QT - MUSE: 368 MS
QTC - MUSE: 437 MS
R AXIS - MUSE: 72 DEGREES
SYSTOLIC BLOOD PRESSURE - MUSE: NORMAL MMHG
T AXIS - MUSE: 10 DEGREES
VENTRICULAR RATE- MUSE: 85 BPM

## 2022-08-29 ENCOUNTER — VIRTUAL VISIT (OUTPATIENT)
Dept: INTERNAL MEDICINE | Facility: CLINIC | Age: 32
End: 2022-08-29
Payer: MEDICAID

## 2022-08-29 DIAGNOSIS — F41.9 ANXIETY: Primary | ICD-10-CM

## 2022-08-29 DIAGNOSIS — R20.2 PARESTHESIA: ICD-10-CM

## 2022-08-29 PROCEDURE — 96127 BRIEF EMOTIONAL/BEHAV ASSMT: CPT | Mod: 95 | Performed by: NURSE PRACTITIONER

## 2022-08-29 PROCEDURE — 99214 OFFICE O/P EST MOD 30 MIN: CPT | Mod: 95 | Performed by: NURSE PRACTITIONER

## 2022-08-29 RX ORDER — BUSPIRONE HYDROCHLORIDE 15 MG/1
15 TABLET ORAL 2 TIMES DAILY
Qty: 60 TABLET | Refills: 1 | Status: SHIPPED | OUTPATIENT
Start: 2022-08-29 | End: 2022-10-26

## 2022-08-29 ASSESSMENT — PATIENT HEALTH QUESTIONNAIRE - PHQ9
SUM OF ALL RESPONSES TO PHQ QUESTIONS 1-9: 12
SUM OF ALL RESPONSES TO PHQ QUESTIONS 1-9: 12
10. IF YOU CHECKED OFF ANY PROBLEMS, HOW DIFFICULT HAVE THESE PROBLEMS MADE IT FOR YOU TO DO YOUR WORK, TAKE CARE OF THINGS AT HOME, OR GET ALONG WITH OTHER PEOPLE: VERY DIFFICULT

## 2022-08-29 NOTE — PROGRESS NOTES
"Iris is a 32 year old who is being evaluated via a billable phoone visit.      How would you like to obtain your AVS? MyChart  If the video visit is dropped, the invitation should be resent by:  cell phone: [phone]  Will anyone else be joining your video visit? No        Assessment & Plan     Anxiety    - busPIRone (BUSPAR) 15 MG tablet; Take 1 tablet (15 mg) by mouth 2 times daily  - Adult Neurology  Referral; Future    Paresthesia    - Adult Neurology  Referral; Future    F/u PCP 2-4 weeks         BMI:   Estimated body mass index is 59.36 kg/m  as calculated from the following:    Height as of 2/1/22: 1.588 m (5' 2.5\").    Weight as of 2/1/22: 149.6 kg (329 lb 12.8 oz).           Bettina Andrade NP  [facility]    Adelina Simmons is a 32 year old accompanied by her mother, presenting for the following health issues:  Follow Up ([facility] ER on 08/23/22 chest pain)      History of Present Illness       Reason for visit:  Follow up to ER visit and numbness  Symptom onset:  1-2 weeks ago  Symptoms include:  Numbness, loss of sensation, loss of appetite/taste/smell, anxiety due to this  Symptom intensity:  Severe  Symptom progression:  Worsening  Had these symptoms before:  No  What makes it worse:  Lying down makes it harder to breathe and seems to make me (at least temporarily) more numb  What makes it better:  Possibly sitting in one position and being hyperfocused on something complex or funny    She eats 0-1 servings of fruits and vegetables daily.She consumes 0 sweetened beverage(s) daily.She exercises with enough effort to increase her heart rate 9 or less minutes per day.  She exercises with enough effort to increase her heart rate 3 or less days per week. She is missing 2 dose(s) of medications per week.  She is not taking prescribed medications regularly due to other.    Today's PHQ-9         PHQ-9 Total Score: 12    PHQ-9 Q9 Thoughts of better off " dead/self-harm past 2 weeks :   Not at all    How difficult have these problems made it for you to do your work, take care of things at home, or get along with other people: Very difficult       ED/UC Followup:    Facility:  [facility] ER  Date of visit: 08/23/22  Reason for visit: chest pain  Current Status: still having numbness entire body and anxiety.  ER w/u unremarkable. Asking if MRI warranted.        Review of Systems   CONSTITUTIONAL: NEGATIVE for fever, chills, change in weight  ENT/MOUTH: NEGATIVE for ear, mouth and throat problems  RESP: NEGATIVE for significant cough or SOB  CV: NEGATIVE for chest pain, palpitations or peripheral edema      Objective           Vitals:  No vitals were obtained today due to virtual visit.    Physical Exam     Phone visit - anxious, but good historian            Phone time 14 min  .  ..

## 2022-09-29 ENCOUNTER — OFFICE VISIT (OUTPATIENT)
Dept: INTERNAL MEDICINE | Facility: CLINIC | Age: 32
End: 2022-09-29
Payer: MEDICAID

## 2022-09-29 VITALS
WEIGHT: 293 LBS | HEART RATE: 124 BPM | TEMPERATURE: 96.6 F | RESPIRATION RATE: 20 BRPM | BODY MASS INDEX: 61.36 KG/M2 | OXYGEN SATURATION: 96 % | DIASTOLIC BLOOD PRESSURE: 88 MMHG | SYSTOLIC BLOOD PRESSURE: 146 MMHG

## 2022-09-29 DIAGNOSIS — R20.0 NUMBNESS: Primary | ICD-10-CM

## 2022-09-29 DIAGNOSIS — F41.9 ANXIETY: ICD-10-CM

## 2022-09-29 PROCEDURE — 99213 OFFICE O/P EST LOW 20 MIN: CPT | Performed by: INTERNAL MEDICINE

## 2022-09-29 PROCEDURE — 36415 COLL VENOUS BLD VENIPUNCTURE: CPT | Performed by: INTERNAL MEDICINE

## 2022-09-29 PROCEDURE — 86618 LYME DISEASE ANTIBODY: CPT | Performed by: INTERNAL MEDICINE

## 2022-09-29 NOTE — PROGRESS NOTES
"  Assessment & Plan     Numbness  A sense of decreased sensation on her right side, and had some generalization but now is more restricted to the right.  Exam is without other findings that might suggest spinal cord issues, other central changes.  Not highly consistent with MS given the rapid change.  Check a Lyme test and encouraged her to go ahead and keep the appointment with neurology as scheduled.  - Lyme Disease Total Abs Bld with Reflex to Confirm CLIA; Future  - Lyme Disease Total Abs Bld with Reflex to Confirm CLIA    Anxiety  She is having significant anxiety which hopefully will improve once the assessment is complete.  We will continue to monitor, could adjust medication later.            Return in about 18 weeks (around 2/2/2023) for Physical Exam.    Sneha Arriola MD  [facility]    Adelina Simmons is a 32 year old, presenting for the following health issues:  She presents with symptoms of \"numbness\".  This is a decrease in sensation.  This started about 6 weeks ago, first was aware of it in her right forearm.  It seemed to be her entire forearm and over the next day seem to involve the entire right arm.  Over the next few days  she started to feel this involving the entire right side of her body.  She was seen in urgent care about 5 days after the onset of symptoms, her exam was fairly unremarkable.  They recommended she follow-up with a neurologist and her primary provider.    Her symptoms worsened over the next couple of days and seem to involve her entire body.  She felt tightness in her chest associated with it and started to have extreme anxiety, worried she would stop breathing at night.  She presented to the ED a couple days later where exam did not show any concerning changes.  She felt better after some lorazepam.    She then had a follow-up virtual visit with one of my colleagues a month ago, several days after the ED visit.  She was put on buspirone for anxiety " but she never started it.  She was referred to neurology does have an appointment coming up in a couple weeks.    Currently she reports that the diminished sensation is still present most of the time on the right side.  She sleeps with a wedge pillow which seems to help her feel more comfortable with her breathing.  She can get some tingling in her ulnar forearm when she is lying flat.    She was worried because she could not feel that her heart was pounding when she climbs stairs like it normally does.  She did have some loss of taste and smell and appetite    Around the time the symptoms started, they are improving.    She does admit she does have a lot of anxiety about this but is starting to feel a little less fearful that she is going to die.    She did have negative COVID testing.  She has no known tick bites, episodes of symptoms suggesting acute Lyme.      History of Present Illness       Reason for visit:  Follow-up as directed from UC/ER for anxiety/numbness    She eats 0-1 servings of fruits and vegetables daily.She consumes 0 sweetened beverage(s) daily.She exercises with enough effort to increase her heart rate 9 or less minutes per day.  She exercises with enough effort to increase her heart rate 3 or less days per week.   She is taking medications regularly.       Patient Active Problem List   Diagnosis     Asperger's disorder     Morbid obesity (H)     Moderate episode of recurrent major depressive disorder (H)     Anxiety     Current Outpatient Medications   Medication Sig Dispense Refill     busPIRone (BUSPAR) 15 MG tablet Take 1 tablet (15 mg) by mouth 2 times daily 60 tablet 1     LORazepam (ATIVAN) 0.5 MG tablet Take 1-2 tablets (0.5-1 mg) by mouth every 8 hours as needed for anxiety 4 tablet 0        Review of Systems   No fever, chills, headaches, double vision, slurred speech, drooping mouth, weakness of arms or legs, incoordination      Objective    BP (!) 146/88   Pulse (!) 124   Temp (!)  96.6  F (35.9  C) (Tympanic)   Resp 20   Wt (!) 154.6 kg (340 lb 14.4 oz)   LMP 08/23/2022   SpO2 96%   BMI 61.36 kg/m    Body mass index is 61.36 kg/m .  Physical Exam     She has

## 2022-10-01 LAB — B BURGDOR IGG+IGM SER QL: 0.18

## 2022-10-16 ENCOUNTER — HEALTH MAINTENANCE LETTER (OUTPATIENT)
Age: 32
End: 2022-10-16

## 2022-10-25 ENCOUNTER — MYC MEDICAL ADVICE (OUTPATIENT)
Dept: INTERNAL MEDICINE | Facility: CLINIC | Age: 32
End: 2022-10-25

## 2022-10-25 DIAGNOSIS — F41.9 ANXIETY: ICD-10-CM

## 2022-10-26 RX ORDER — BUSPIRONE HYDROCHLORIDE 15 MG/1
15 TABLET ORAL 2 TIMES DAILY
Qty: 180 TABLET | Refills: 1 | Status: SHIPPED | OUTPATIENT
Start: 2022-10-26 | End: 2023-12-20

## 2022-12-13 ENCOUNTER — MYC MEDICAL ADVICE (OUTPATIENT)
Dept: INTERNAL MEDICINE | Facility: CLINIC | Age: 32
End: 2022-12-13

## 2022-12-13 DIAGNOSIS — F41.9 ANXIETY: Primary | ICD-10-CM

## 2022-12-20 ENCOUNTER — MYC MEDICAL ADVICE (OUTPATIENT)
Dept: INTERNAL MEDICINE | Facility: CLINIC | Age: 32
End: 2022-12-20

## 2022-12-28 RX ORDER — PROPRANOLOL HYDROCHLORIDE 10 MG/1
10-40 TABLET ORAL 2 TIMES DAILY PRN
Qty: 30 TABLET | Refills: 1 | Status: SHIPPED | OUTPATIENT
Start: 2022-12-28 | End: 2023-01-11

## 2023-01-09 DIAGNOSIS — F41.9 ANXIETY: ICD-10-CM

## 2023-01-11 RX ORDER — PROPRANOLOL HYDROCHLORIDE 10 MG/1
TABLET ORAL
Qty: 30 TABLET | Refills: 1 | Status: SHIPPED | OUTPATIENT
Start: 2023-01-11 | End: 2023-12-20

## 2023-01-11 NOTE — TELEPHONE ENCOUNTER
Pending Prescriptions:                       Disp   Refills    propranolol (INDERAL) 10 MG tablet [Pharma*30 tab*1        Sig: TAKE 1 TO 4 TABLETS(10 TO 40 MG) BY MOUTH TWICE DAILY           AS NEEDED FOR ANXIETY      Routing refill request to provider for review/approval because:  BP Readings from Last 3 Encounters:   09/29/22 (!) 146/88   08/23/22 (!) 160/107   02/01/22 (!) 148/99

## 2023-01-16 ENCOUNTER — MYC MEDICAL ADVICE (OUTPATIENT)
Dept: INTERNAL MEDICINE | Facility: CLINIC | Age: 33
End: 2023-01-16
Payer: COMMERCIAL

## 2023-01-16 DIAGNOSIS — F41.9 ANXIETY: Primary | ICD-10-CM

## 2023-01-17 RX ORDER — BUSPIRONE HYDROCHLORIDE 30 MG/1
30 TABLET ORAL 2 TIMES DAILY
Qty: 120 TABLET | Refills: 1 | Status: SHIPPED | OUTPATIENT
Start: 2023-01-17 | End: 2023-12-20

## 2023-01-18 ENCOUNTER — ALLIED HEALTH/NURSE VISIT (OUTPATIENT)
Dept: INTERNAL MEDICINE | Facility: CLINIC | Age: 33
End: 2023-01-18
Payer: COMMERCIAL

## 2023-01-18 DIAGNOSIS — Z23 NEED FOR VACCINATION: Primary | ICD-10-CM

## 2023-01-18 PROCEDURE — 90715 TDAP VACCINE 7 YRS/> IM: CPT | Performed by: INTERNAL MEDICINE

## 2023-01-18 PROCEDURE — 99207 PR NO CHARGE LOS: CPT | Performed by: INTERNAL MEDICINE

## 2023-01-18 PROCEDURE — 90471 IMMUNIZATION ADMIN: CPT | Performed by: INTERNAL MEDICINE

## 2023-01-18 NOTE — NURSING NOTE
Prior to immunization administration, verified patients identity using patient s name and date of birth. Please see Immunization Activity for additional information.     Screening Questionnaire for Adult Immunization    Are you sick today?   No   Do you have allergies to medications, food, a vaccine component or latex?   No   Have you ever had a serious reaction after receiving a vaccination?   No   Do you have a long-term health problem with heart, lung, kidney, or metabolic disease (e.g., diabetes), asthma, a blood disorder, no spleen, complement component deficiency, a cochlear implant, or a spinal fluid leak?  Are you on long-term aspirin therapy?   No   Do you have cancer, leukemia, HIV/AIDS, or any other immune system problem?   No   Do you have a parent, brother, or sister with an immune system problem?   No   In the past 3 months, have you taken medications that affect  your immune system, such as prednisone, other steroids, or anticancer drugs; drugs for the treatment of rheumatoid arthritis, Crohn s disease, or psoriasis; or have you had radiation treatments?   No   Have you had a seizure, or a brain or other nervous system problem?   No   During the past year, have you received a transfusion of blood or blood    products, or been given immune (gamma) globulin or antiviral drug?   No   For women: Are you pregnant or is there a chance you could become       pregnant during the next month?   No   Have you received any vaccinations in the past 4 weeks?   No     Immunization questionnaire answers were all negative.        Per orders of Dr. Arriola, injection of Tdap given by Trudi Jacques CMA. Patient instructed to remain in clinic for 15 minutes afterwards, and to report any adverse reaction to me immediately.       Screening performed by Trudi Jacques CMA on 1/18/2023 at 10:26 AM.

## 2023-02-03 ENCOUNTER — TELEPHONE (OUTPATIENT)
Dept: INTERNAL MEDICINE | Facility: CLINIC | Age: 33
End: 2023-02-03
Payer: COMMERCIAL

## 2023-02-03 NOTE — TELEPHONE ENCOUNTER
Reason for Call:  Appointment Request    Patient requesting this type of appt: Chronic Diease Management/Medication/Follow-Up    Requested provider: Sneha Arriola    Reason patient unable to be scheduled: Not within requested timeframe    When does patient want to be seen/preferred time: As soon as possible    Comments: Pt is having new symptoms she would like to discuss with provider, and is it in regards to last appt.     Could we send this information to you in [facility] or would you prefer to receive a phone call?:   Patient would prefer a phone call   Okay to leave a detailed message?: Yes at Home number on file [phone] (home)    Call taken on 2/3/2023 at 3:53 PM by Mac Nguyễn

## 2023-02-06 NOTE — TELEPHONE ENCOUNTER
Advised patient no sooner OV with Dr. Arriola than May. Patient will call back in the next few days and make an OV with another Provider.

## 2023-03-26 ENCOUNTER — HEALTH MAINTENANCE LETTER (OUTPATIENT)
Age: 33
End: 2023-03-26

## 2023-12-05 ENCOUNTER — OFFICE VISIT (OUTPATIENT)
Dept: INTERNAL MEDICINE | Facility: CLINIC | Age: 33
End: 2023-12-05
Payer: COMMERCIAL

## 2023-12-05 VITALS
TEMPERATURE: 98 F | SYSTOLIC BLOOD PRESSURE: 138 MMHG | RESPIRATION RATE: 22 BRPM | WEIGHT: 293 LBS | OXYGEN SATURATION: 99 % | HEART RATE: 115 BPM | HEIGHT: 63 IN | DIASTOLIC BLOOD PRESSURE: 78 MMHG | BODY MASS INDEX: 51.91 KG/M2

## 2023-12-05 DIAGNOSIS — R20.0 NUMBNESS: Primary | ICD-10-CM

## 2023-12-05 DIAGNOSIS — L68.0 HIRSUTISM: ICD-10-CM

## 2023-12-05 DIAGNOSIS — E66.01 MORBID OBESITY (H): ICD-10-CM

## 2023-12-05 DIAGNOSIS — F33.1 MODERATE EPISODE OF RECURRENT MAJOR DEPRESSIVE DISORDER (H): ICD-10-CM

## 2023-12-05 DIAGNOSIS — F41.9 ANXIETY: ICD-10-CM

## 2023-12-05 DIAGNOSIS — N92.6 IRREGULAR MENSES: ICD-10-CM

## 2023-12-05 PROCEDURE — 99215 OFFICE O/P EST HI 40 MIN: CPT | Performed by: INTERNAL MEDICINE

## 2023-12-05 PROCEDURE — 99417 PROLNG OP E/M EACH 15 MIN: CPT | Performed by: INTERNAL MEDICINE

## 2023-12-05 ASSESSMENT — ENCOUNTER SYMPTOMS: NUMBNESS: 1

## 2023-12-05 ASSESSMENT — PATIENT HEALTH QUESTIONNAIRE - PHQ9
10. IF YOU CHECKED OFF ANY PROBLEMS, HOW DIFFICULT HAVE THESE PROBLEMS MADE IT FOR YOU TO DO YOUR WORK, TAKE CARE OF THINGS AT HOME, OR GET ALONG WITH OTHER PEOPLE: VERY DIFFICULT
SUM OF ALL RESPONSES TO PHQ QUESTIONS 1-9: 14
SUM OF ALL RESPONSES TO PHQ QUESTIONS 1-9: 14

## 2023-12-05 NOTE — PATIENT INSTRUCTIONS
"Would recommend getting a second neurologic opinion.   Consider Janessa Enriquez ([facility]) [phone], or Fátima Dexter ([facility] [place] [facility] [place]) [phone].    Suspect that the chronic difficulties with erratic menses is related to absence of ovulation.   It may well be from \"polycystic ovary syndrome\". This might be diagnosed by OB-GYN based upon the history and exam (irregular menses, some facial hair), with or without some lab tests or ultrasound to show multiple ovarian cysts.     See me in around three months.    "

## 2023-12-05 NOTE — PROGRESS NOTES
"Face to face time with patient and mother: 140 minutes (1227---1317, 1635---1805)     Assessment & Plan   (R20.0) Numbness  (primary encounter diagnosis)  Comment: Acknowledged and validated authenticity of patient's symptoms. Discussed that the description of symptoms do not seem to \"follow the medical textbook descriptions\" of worrisome life-changing neurologic conditions. We agreed that a second neurologic office consult could be greatly beneficial, and also that anxiety may be contributing to her experience of symptoms.   Plan: Adult Neurology  Referral          (F33.1) Moderate episode of recurrent major depressive disorder (H  (F41.9) Anxiety  Comment: Discussed that future mental health consultation (medication review and/or \"talk therapy\" could be quite beneficial. She wishes to defer this referral.     (E66.01) Morbid obesity (H)  (N92.6) Irregular menses  (L68.0) Hirsutism  Comment: Clinical presentation quite suspicious for PCOS. Discussed with patient. Advised OB-GYN consultation with [place] office provider.   Plan: Ob/Gyn  Referral             BMI:   Estimated body mass index is 62.71 kg/m  as calculated from the following:    Height as of this encounter: 1.6 m (5' 3\").    Weight as of this encounter: 160.6 kg (354 lb).   Weight management plan: Discussed healthy diet and exercise guidelines    Patient Instructions   Would recommend getting a second neurologic opinion.   Consider Janessa Enriquez ([facility]) [phone], or Fátima Dexter ([facility]) [phone].    Suspect that the chronic difficulties with erratic menses is related to absence of ovulation.   It may well be from \"polycystic ovary syndrome\". This might be diagnosed by OB-GYN based upon the history and exam (irregular menses, some facial hair), with or without some lab tests or ultrasound to show multiple ovarian cysts.     See me in around three months.      Triston Licona MD, " "[facility]    Adelina Simmons is a 33 year old, presenting for the following health issues:  Numbness and Abnormal Bleeding Problem      12/5/2023    11:21 AM   Additional Questions   Roomed by Roxane DAVIS CMA   Accompanied by mother Ruff       Numbness  Associated symptoms include numbness.   Abnormal Bleeding Problem  Associated symptoms include numbness.   History of Present Illness       Reason for visit:  Vaginal bleeding and other things  Symptom onset:  More than a month  Symptoms include:  Bleeding almost every day and other things  Symptom intensity:  Moderate  Symptom progression:  Worsening  Had these symptoms before:  No  What makes it worse:  For numbness, lying on my side  What makes it better:  Not anymore    She eats 0-1 servings of fruits and vegetables daily.She consumes 0 sweetened beverage(s) daily.She exercises with enough effort to increase her heart rate 9 or less minutes per day.  She exercises with enough effort to increase her heart rate 3 or less days per week.   She is taking medications regularly.     The patient's mother had arranged this appointment so as to be present and to assist in providing medical history, with the patient's consent.    Iris Toscano has several concerns, which she frames most notably into two major issues to discuss: \"Numbness\" and irregular menses.     The patient has kept a diary of symptoms which she brings to her appointment, with which she is able to provide substantial detail in reviewing symptoms of persistent numbness dating back to August 16, 2022.  She describes two predominant \"episodes\" of numbness in a body locations and with temporal patterns and associated symptoms as outlined below:    Her \"first episode\" began on 8/16/2022 during the \"wee hours\". She reports first developing acute right arm numbness.  This sense of numbness gradually spread to involve the entire right side of her body with \"a sharp dividing line " "down the middle\". She presented to [facility] on 8/19/2022. The progress note from this encounter is available for review. The provider contacted the neurologist on call to assist in coming up with a plan. The note documents that there was \"no red flag history or exam at this time\", that this was a \"simple sensory deficit\" and with no history of trauma, headache or other focal deficit, a head CT was \"technically not indicated\" and that \"a brain MRI without contrast would be much more informative\".   It was noted that the patient was anxious as she was unemployed and without health insurance.   A future outpatient neurology consult was recommended, and the patient expressed an interest in pursuing this with her primary clinic and in discussing symptoms with her primary care provider.    Over the ensuing weekend she reports that the symptoms of numbness persisted on her right side and \"spread to the left side\", and by the following Monday the numbness involved her \"whole body\", but was \"not as bad on  the left\" side. On */22/2022 she also noted that \"couldn't feel anything inside of my chest\". For example, when eating rotisserie chicken she \"couldn't feel the pressure but could feel the heat coming down my esophagus\".     The patient is able to provide abundant additional detailed information as she referred to her written diary. She was allowed abundant time to present this detail. She was advised that I may not be able to find each element of this detailed history helpful in honing in on a diagnosis, while still acknowledging that her symptoms represent her authentic subjective experience and warranted careful listening and consideration. She does acknowledge that her symptoms do create some level of confusion to herself and to the listener, and also does acknowledge that anxiety may be contributing to her symptomatology.   However, she does also report getting a sense that most " "previous listeners have made her feel that they were dismissive of her symptoms and that no significant or worrisome diagnosis could be contributory other than anxiety.    She noted that on Tuesday August 23rd she had a vivid dream the \"I couldn't breath right\", and that she felt nauseated. There is documentation of telephone RN triage with the patient.     She presented to [facility] ED. Evaluation included extensive lab testing, an EKG, and DEC evaluation. She did receive an Rx for lorazepam 0.5 mg tablets.   The physician noted her neurologic symptoms, including \"whole-body numbness starting on her right side that progressed to her left from her head to the tip of her toes with no associated vision changes, weakness or pain\". He reports advising her that her symptoms \"do not lateralize to the location of the brain or spinal cord\" and therefore that he saw no indication for neuroimaging.   He noted that she was \"hyperverbal with pressured speech, tearful and worried about life-threatening or life limiting etiologies and has been googling them\".   He felt that it was \"clear from her presentation that anxiety, for which she has not seen anyone since 2019 is a major contributing factor\". He also reported that \"after dose of Ativan, she did report her symptoms significantly improved'.   He recommended that she follow up with her PCP regarding her somatic complaints.     Further entries from her diary are reported. She reports that symptoms progressed over about a week. She noted \"reduced fine touch\" and reduced pain, pressure, temperature, taste and smell\".    Regarding anxiety, she reports that previous \"normal anxiety attacks\" have led her to cry and feel hopeless, to lie in bed and cry for about 15 minutes and then settle into a state of \"emotional numbness\". She reports never before having this preponderance of physical symptoms.     After noting some occasional chest pressure and numbness that varied in " "severity \"by day and by body position\" (eg, \"more numb on the left side when lying on her left side\").     She saw Bettina Andrade in our clinic on 8/29/2022 and was prescribed Buspar.   She saw Sneha Arriola on 9/29/2022 who recommended neurology consultation (already scheduled). Lyme serology was ordered which returned negative.     Around this time she also noted symptoms of a \"hot face\", persistent numbness, and an absence of chest sensation which would wax and wane during the day.     The patient saw Dr Hansel Mullins with [facility] on 10/12/2022. She recalls being told that she had a \"neurochemical imbalance caused by anxiety\".   She found this statement to be dismissive as well as to be counterintuitive to her, in that she had \"never heard of an abrupt onset (of physical symptoms) without awareness of feeling anxious\".   She reports that no follow up testing was advised.     On 12/3/2022 she reports the onset of what she calls her \"second episode\" of numbness which also occurred \"out of nowhere\". She acknowledges having a greater awareness during this episode of \"more mental stuff going on\". This time symptoms started on the left side, included a \"flushed face\" She describes a \"distinct feeling like I was a passenger in a care with no control of the vehicle\". She describes additional symptoms including a \"crushing pressure in chest,\" facial flushing, severe sleep disturbance, periorbital tissue \"puffiness\", fleeting palpitations.  On 12/12/2022 she took a lorazepam tablet and noted that symptoms started to lessen.     An exchange of [email or web address] messages with Dr Arriola around this time coincided with the patient making adjustments in her Buspar dosing. She believes that she was taking one and one-half Buspar tablets twice daily by the end of December. On or about 1/20/2023 she increased her Buspar to 30 mg twice daily. Sometime in March 2023 she began to taper herself off of Buspar, due to an " "impression of a lack of benefit and possibly causing adverse effects.   She reports a constellation of some other symptoms over subsequent weeks to months including shooting pains and numbness in in her left thumb. She noted something similar in her neck a few weeks later, and that her elbow cracked on one occasion. She reports occasional \"muddiness\" in one or both ears.     She reports that all of the sensation changes fluctuate in severity over the past several months but have never resolved back to normal.     We also discussed her pattern of erratic and unpredictable menstrual cycles over several months. She reports that menses would be anywhere from 3 to 38 days apart, most often 12 to 28 days apart.     We discussed that a very common cause of such irregular menses is anovulation. We discussed that she has other medical features, such as morbid obesity, increased facial hair and acne, that make a diagnosis of Polycystic Ovary Syndrome quite likely. Discussed that an OB-GYN consult would be warranted to better confirm and offer further clarity on this diagnosis.       Past medical, family and social histories as well as medications reviewed and updated as needed    Notable contributing historical information includes a detailed Psychological Consultation by Tayler Garza Psy.D, , SILVANA which was conducted on 3/30/2022 as part of a Social Security Disability evaluation. DSM-5 Diagnoses included Autism Spectrum Disorder (very mild; by history), Adjustment Disorder with Mixed Anxiety and Depressed Mood (mild), R/O ADHD. The examiner noted that \"some characteristics associated with Autism Spectrum Disorder could adversely impact relationships in an employment-type setting,\" and that she is \"moderately able to tolerate the mental stressos of at least an entry level workplace\". She noted that the patient had maintained employment in the past. She noted that the patient was \"not currently treating her mental health " "symptoms and that doing so could be beneficial,\" The patient also \"noted concerns with physical stressors\".     We reviewed significant family stressors in the past several months to couple of years.   Some losses (an aunt and grandparent passing away), being somewhat increasingly socially isolated related to the pandemic, being unable to find/engage in employment, mother with cancer, father being diagnosed with primary progressive aphasia.       Review of Systems   Genitourinary:  Positive for menstrual problem.   Neurological:  Positive for numbness.      REVIEW OF SYSTEMS: The following systems have been completely reviewed and are negative except as noted above:   Constitutional, HEENT, respiratory, cardiovascular, gastrointestinal, genitourinary, musculoskeletal, dermatologic, hematologic, endocrine, psychiatric, and neurologic systems.        Objective    /78 (BP Location: Left arm, Patient Position: Sitting, Cuff Size: Thigh)   Pulse 115   Temp 98  F (36.7  C) (Oral)   Resp 22   Ht 1.6 m (5' 3\")   Wt (!) 160.6 kg (354 lb)   LMP  (LMP Unknown)   SpO2 99%   Breastfeeding No   BMI 62.71 kg/m    Body mass index is 62.71 kg/m .    Physical Exam   GENERAL: alert. Speech at times rapid and slightly pressured. Abrupt but brief episodes of tearfulness.     EYES: Eyes grossly normal to inspection, PERRL and conjunctivae and sclerae normal  RESP: lungs clear to auscultation - no rales, rhonchi or wheezes  CV: regular rate and rhythm, normal S1 S2, no S3 or S4, no murmur, click or rub, no peripheral edema and peripheral pulses strong  MS: no gross musculoskeletal defects noted, no edema  SKIN: mild facial acne and facial hirsutism.   NEURO: Normal strength and tone, mentation intact and speech normal  PSYCH: anxious. At times tearful (briefly). Speech overly inclusive in detail, at times slightly pressured and rapid. See general descriptions above.     "

## 2023-12-20 ENCOUNTER — TELEPHONE (OUTPATIENT)
Dept: URGENT CARE | Facility: URGENT CARE | Age: 33
End: 2023-12-20

## 2023-12-20 ENCOUNTER — OFFICE VISIT (OUTPATIENT)
Dept: URGENT CARE | Facility: URGENT CARE | Age: 33
End: 2023-12-20
Payer: COMMERCIAL

## 2023-12-20 VITALS
SYSTOLIC BLOOD PRESSURE: 155 MMHG | TEMPERATURE: 97.5 F | RESPIRATION RATE: 14 BRPM | HEART RATE: 105 BPM | BODY MASS INDEX: 61.11 KG/M2 | DIASTOLIC BLOOD PRESSURE: 81 MMHG | WEIGHT: 293 LBS | OXYGEN SATURATION: 97 %

## 2023-12-20 DIAGNOSIS — R07.89 CHEST WALL PAIN: ICD-10-CM

## 2023-12-20 DIAGNOSIS — R05.1 ACUTE COUGH: Primary | ICD-10-CM

## 2023-12-20 DIAGNOSIS — J01.90 ACUTE SINUSITIS WITH COEXISTING CONDITION, NEED PROPHYLACTIC TREATMENT: ICD-10-CM

## 2023-12-20 PROCEDURE — 99214 OFFICE O/P EST MOD 30 MIN: CPT | Performed by: PHYSICIAN ASSISTANT

## 2023-12-20 PROCEDURE — 87635 SARS-COV-2 COVID-19 AMP PRB: CPT | Performed by: PHYSICIAN ASSISTANT

## 2023-12-20 RX ORDER — CODEINE PHOSPHATE AND GUAIFENESIN 10; 100 MG/5ML; MG/5ML
1-2 SOLUTION ORAL EVERY 4 HOURS PRN
Qty: 180 ML | Refills: 0 | Status: SHIPPED | OUTPATIENT
Start: 2023-12-20 | End: 2024-01-12

## 2023-12-20 RX ORDER — METHYLPREDNISOLONE 4 MG
TABLET, DOSE PACK ORAL
Qty: 21 TABLET | Refills: 0 | Status: SHIPPED | OUTPATIENT
Start: 2023-12-20 | End: 2024-01-12

## 2023-12-20 RX ORDER — FLUTICASONE PROPIONATE 50 MCG
1 SPRAY, SUSPENSION (ML) NASAL DAILY
Qty: 18.2 ML | Refills: 0 | Status: SHIPPED | OUTPATIENT
Start: 2023-12-20 | End: 2024-01-12

## 2023-12-20 NOTE — TELEPHONE ENCOUNTER
Called and spoke to patient regarding instructions use of Medrol Dosepak. Explained where to find the instruction per Providers note. Pt voiced understanding no other concerns.    Boone Warren, CMA on 12/20/2023 at 5:13 PM

## 2023-12-20 NOTE — PROGRESS NOTES
Patient presents with:  URI: Dry cough, chest pain when coughing, congestion, horsy voice.     (R05.1) Acute cough  (primary encounter diagnosis)  Comment:   Plan: Symptomatic COVID-19 Virus (Coronavirus) by PCR        Nose, methylPREDNISolone (MEDROL DOSEPAK) 4 MG         tablet therapy pack, guaiFENesin-codeine         (ROBITUSSIN AC) 100-10 MG/5ML solution            (J01.90) Acute sinusitis with coexisting condition, need prophylactic treatment  Comment:   Plan: fluticasone (FLONASE) 50 MCG/ACT nasal spray,         amoxicillin-clavulanate (AUGMENTIN) 875-125 MG         tablet            (R07.89) Chest wall pain  Comment:   Plan: methylPREDNISolone (MEDROL DOSEPAK) 4 MG tablet        therapy pack            Tylenol as needed for persistent chest discomfort.      Elevate head of bed while sleeping (such as a wedge) to decrease coughing at night.      Follow up with primary clinic should symptoms persist or worsen.        At the end of the encounter, I discussed results, diagnosis, medications. Discussed red flags for immediate return to clinic/ER, as well as indications for follow up if no improvement. Patient understood and agreed to plan. Patient was stable for discharge       SUBJECTIVE:   Iris Toscano is a 33 year old female who presents today with one week history of cough and chills, some body aches.      Cough is intermittently productive in the past couple of days.        Patient Active Problem List   Diagnosis    Asperger's disorder    Morbid obesity (H)    Moderate episode of recurrent major depressive disorder (H)    Anxiety         Past Medical History:   Diagnosis Date    Anxiety     Asperger's syndrome     Depressive disorder          Current Outpatient Medications   Medication Sig Dispense Refill    Multiple Vitamins-Iron (DAILY-JUAN/IRON/BETA-CAROTENE) TABS TAKE 1 TABLET BY MOUTH DAILY. (Patient not taking: Reported on 10/19/2020) 30 tablet 7     Social History     Tobacco Use    Smoking  status: Never Smoker    Smokeless tobacco: Never Used   Substance Use Topics    Alcohol use: Not on file     Family History   Problem Relation Age of Onset    Diabetes Mother     Diabetes Father          ROS:    10 point ROS of systems including Constitutional, Eyes, Respiratory, Cardiovascular, Gastroenterology, Genitourinary, Integumentary, Muscularskeletal, Psychiatric ,neurological were all negative except for pertinent positives noted in my HPI       OBJECTIVE:  BP (!) 155/81 (BP Location: Right arm, Patient Position: Chair, Cuff Size: Adult Large)   Pulse 105   Temp 97.5  F (36.4  C) (Oral)   Resp 14   Wt (!) 156.5 kg (345 lb)   LMP  (LMP Unknown)   SpO2 97%   BMI 61.11 kg/m    Physical Exam:  GENERAL APPEARANCE: healthy, alert and no distress  EYES: EOMI,  PERRL, conjunctiva clear  HENT: ear canals and TM's normal.  Nose and mouth without ulcers, erythema or lesions  HENT: nasal turbinates boggy with bluish hue and rhinorrhea yellow  NECK: supple, nontender, no lymphadenopathy  RESP: lungs clear to auscultation - no rales, rhonchi or wheezes  CV: regular rates and rhythm, normal S1 S2, no murmur noted  NEURO: Normal strength and tone, sensory exam grossly normal,  normal speech and mentation  SKIN: no suspicious lesions or rashes

## 2023-12-20 NOTE — PATIENT INSTRUCTIONS
(R05.1) Acute cough  (primary encounter diagnosis)  Comment:   Plan: Symptomatic COVID-19 Virus (Coronavirus) by PCR        Nose, methylPREDNISolone (MEDROL DOSEPAK) 4 MG         tablet therapy pack, guaiFENesin-codeine         (ROBITUSSIN AC) 100-10 MG/5ML solution            (J01.90) Acute sinusitis with coexisting condition, need prophylactic treatment  Comment:   Plan: fluticasone (FLONASE) 50 MCG/ACT nasal spray,         amoxicillin-clavulanate (AUGMENTIN) 875-125 MG         tablet            (R07.89) Chest wall pain  Comment:   Plan: methylPREDNISolone (MEDROL DOSEPAK) 4 MG tablet        therapy pack            Tylenol as needed for persistent chest discomfort.      Elevate head of bed while sleeping (such as a wedge) to decrease coughing at night.      Follow up with primary clinic should symptoms persist or worsen.

## 2023-12-20 NOTE — TELEPHONE ENCOUNTER
Patient calls for clarification on the methylprednisolone therapy pack. Patient's says the box of medication states to follow prescriber instructions, but provider instructions say to follow instructions on box.    Please advise on what dose patient should start at, how long, and when to taper.    Thank you,  Will Bernal, Triage RN Donovan Parrish  2:30 PM 12/20/2023

## 2023-12-21 LAB — SARS-COV-2 RNA RESP QL NAA+PROBE: NEGATIVE

## 2024-01-12 ENCOUNTER — OFFICE VISIT (OUTPATIENT)
Dept: OBGYN | Facility: CLINIC | Age: 34
End: 2024-01-12
Payer: COMMERCIAL

## 2024-01-12 VITALS
WEIGHT: 293 LBS | BODY MASS INDEX: 51.91 KG/M2 | HEIGHT: 63 IN | SYSTOLIC BLOOD PRESSURE: 138 MMHG | DIASTOLIC BLOOD PRESSURE: 90 MMHG

## 2024-01-12 DIAGNOSIS — Z12.4 SCREENING FOR MALIGNANT NEOPLASM OF CERVIX: ICD-10-CM

## 2024-01-12 DIAGNOSIS — E66.01 CLASS 3 SEVERE OBESITY DUE TO EXCESS CALORIES WITHOUT SERIOUS COMORBIDITY WITH BODY MASS INDEX (BMI) OF 60.0 TO 69.9 IN ADULT (H): ICD-10-CM

## 2024-01-12 DIAGNOSIS — N92.6 IRREGULAR MENSES: Primary | ICD-10-CM

## 2024-01-12 DIAGNOSIS — E66.813 CLASS 3 SEVERE OBESITY DUE TO EXCESS CALORIES WITHOUT SERIOUS COMORBIDITY WITH BODY MASS INDEX (BMI) OF 60.0 TO 69.9 IN ADULT (H): ICD-10-CM

## 2024-01-12 DIAGNOSIS — L68.0 HIRSUTISM: ICD-10-CM

## 2024-01-12 DIAGNOSIS — E66.01 MORBID OBESITY (H): ICD-10-CM

## 2024-01-12 PROBLEM — R20.0 NUMBNESS: Status: ACTIVE | Noted: 2022-08-13

## 2024-01-12 PROCEDURE — 99204 OFFICE O/P NEW MOD 45 MIN: CPT | Performed by: OBSTETRICS & GYNECOLOGY

## 2024-01-12 PROCEDURE — 36415 COLL VENOUS BLD VENIPUNCTURE: CPT | Performed by: OBSTETRICS & GYNECOLOGY

## 2024-01-12 PROCEDURE — 84443 ASSAY THYROID STIM HORMONE: CPT | Performed by: OBSTETRICS & GYNECOLOGY

## 2024-01-12 PROCEDURE — 84146 ASSAY OF PROLACTIN: CPT | Performed by: OBSTETRICS & GYNECOLOGY

## 2024-01-12 PROCEDURE — 83002 ASSAY OF GONADOTROPIN (LH): CPT | Performed by: OBSTETRICS & GYNECOLOGY

## 2024-01-12 PROCEDURE — 84270 ASSAY OF SEX HORMONE GLOBUL: CPT | Performed by: OBSTETRICS & GYNECOLOGY

## 2024-01-12 PROCEDURE — 84403 ASSAY OF TOTAL TESTOSTERONE: CPT | Performed by: OBSTETRICS & GYNECOLOGY

## 2024-01-12 PROCEDURE — 87624 HPV HI-RISK TYP POOLED RSLT: CPT | Performed by: OBSTETRICS & GYNECOLOGY

## 2024-01-12 PROCEDURE — 82627 DEHYDROEPIANDROSTERONE: CPT | Performed by: OBSTETRICS & GYNECOLOGY

## 2024-01-12 PROCEDURE — G0145 SCR C/V CYTO,THINLAYER,RESCR: HCPCS | Performed by: OBSTETRICS & GYNECOLOGY

## 2024-01-12 PROCEDURE — 83001 ASSAY OF GONADOTROPIN (FSH): CPT | Performed by: OBSTETRICS & GYNECOLOGY

## 2024-01-12 NOTE — NURSING NOTE
"Chief Complaint   Patient presents with    Vaginal Bleeding     Abnormal heavy bleeding       Initial BP (!) 138/90 (BP Location: Right arm, Patient Position: Sitting, Cuff Size: Adult Large)   Ht 1.6 m (5' 3\")   Wt (!) 158.8 kg (350 lb)   LMP 2023 (Exact Date)   BMI 62.00 kg/m   Estimated body mass index is 62 kg/m  as calculated from the following:    Height as of this encounter: 1.6 m (5' 3\").    Weight as of this encounter: 158.8 kg (350 lb).  BP completed using cuff size: large    Questioned patient about current smoking habits.  Pt. has never smoked.          The following HM Due: NONE    Dragan Perez CMA                "

## 2024-01-12 NOTE — PROGRESS NOTES
Assessment & Plan     Irregular menses  - Likely due to chronic anovulation due to PCOS secondary to hypothalamic hypogonadism due to severe obesity  - Will also evaluate for Hypothyroidism, Hyperprolactinemia  - Need to assess endometrial stripe thickness. If >= 4 mm then would need to do endometrial evaluation for hyperplasia or CA.  If this is the case, given Pt's difficult pelvic exam and Cx being quite high in pelvis, will recommend Op Hyst w/ IUM in OR  - TSH with free T4 reflex; Future  - Prolactin; Future  - Follicle stimulating hormone; Future  - Luteinizing Hormone; Future  - US Pelvic Complete with Transvaginal; Future    Morbid obesity (H)  - Potential main factor contributing to Pt's sx's.  Also a risk factor for Hysteroscopy if it becomes needed.  - Will get Endo consult for options for management as well as for her hirsutism.  - Adult Endocrinology  Referral; Future    Hirsutism  - Need to evaluate androgens.  Also get Endo referral to assist w/ management.  - DHEA sulfate; Future  - Testosterone Free and Total; Future  - Adult Endocrinology  Referral; Future    Screening for malignant neoplasm of cervix  - Overdue for screening by 10 years.  - Pap imaged thin layer screen with HPV - recommended age 30 - 65 years (select HPV order below)  - If all neg, since Pt has no Hx sexual activity, she can do screening Q 5 years.    Class 3 severe obesity due to excess calories without serious comorbidity with body mass index (BMI) of 60.0 to 69.9 in adult (H)  - Chronic, severe  - Risk factor for any surgeries, anesthesia.    Review of the result(s) of each unique test - 2/2022 TSH 7.10, 4/4/2022 TSH 2.38             No follow-ups on file.    Xavier Woodard MD  [facility] SEEMA Simmons is a 33 year old, presenting for the following health issues:  Vaginal Bleeding (Abnormal heavy bleeding)      Pt here for long Hx irregular menses.  Pt has had  "irregular menses her entire life since menarche.  Menses has no pattern at all, can occur as often as 10-29 days apart, lasting up to 16 days, avg flow.  Sometimes w/ clots.  No cramps or other abnl discharge.  Pt has never had sexual intercourse, and has never had a Pap test or pelvic exam.  She has noted increased facial hair, including a beard and mustache.  Her TSH 7.10 in 2/2022, but follow-up 4/4/2022 was 2.38.                   Review of Systems         Objective    BP (!) 138/90 (BP Location: Right arm, Patient Position: Sitting, Cuff Size: Adult Large)   Ht 1.6 m (5' 3\")   Wt (!) 158.8 kg (350 lb)   LMP 12/20/2023 (Exact Date)   BMI 62.00 kg/m    Body mass index is 62 kg/m .  Physical Exam  Constitutional:       General: She is not in acute distress.     Appearance: She is obese. She is not ill-appearing.   Neurological:      Mental Status: She is alert.     Abdomen- Abdomen soft, non-tender. BS normal. No masses, organomegaly, positive findings: obese  Pelvic- Exam chaperoned by nurse, External genitalia normal, [place]'s glands normal, [place]'s glands normal, Urethral meatus normal, Urethra normal, Bladder normal, Vagina with normal rugae, no abnormal lesions, no abnormal discharge, Normal cervix without lesions or mucopus, no cervical motion tenderness, Uterus unable to be palpated due to body habitus but no overt masses, non-tender, Adnexa also unable to be palpated due to body habitus but no overt masses or tenderness bilaterally, Anus normal, Pelvic exam limited by obesity, Pap smear was Done,  HPV Done                          "

## 2024-01-13 LAB
FSH SERPL IRP2-ACNC: 2.7 MIU/ML
LH SERPL-ACNC: 3.2 MIU/ML
PROLACTIN SERPL 3RD IS-MCNC: 25 NG/ML (ref 5–23)
SHBG SERPL-SCNC: 16 NMOL/L (ref 30–135)
TSH SERPL DL<=0.005 MIU/L-ACNC: 3.77 UIU/ML (ref 0.3–4.2)

## 2024-01-15 LAB — DHEA-S SERPL-MCNC: 218 UG/DL (ref 35–430)

## 2024-01-16 LAB
TESTOST FREE SERPL-MCNC: 0.38 NG/DL
TESTOST SERPL-MCNC: 15 NG/DL (ref 8–60)

## 2024-01-17 LAB
BKR LAB AP GYN ADEQUACY: NORMAL
BKR LAB AP GYN INTERPRETATION: NORMAL
BKR LAB AP HPV REFLEX: NORMAL
BKR LAB AP LMP: NORMAL
BKR LAB AP PREVIOUS ABNORMAL: NORMAL
PATH REPORT.COMMENTS IMP SPEC: NORMAL
PATH REPORT.COMMENTS IMP SPEC: NORMAL
PATH REPORT.RELEVANT HX SPEC: NORMAL

## 2024-01-19 LAB
HUMAN PAPILLOMA VIRUS 16 DNA: NEGATIVE
HUMAN PAPILLOMA VIRUS 18 DNA: NEGATIVE
HUMAN PAPILLOMA VIRUS FINAL DIAGNOSIS: NORMAL
HUMAN PAPILLOMA VIRUS OTHER HR: NEGATIVE

## 2024-01-26 ENCOUNTER — ANCILLARY PROCEDURE (OUTPATIENT)
Dept: ULTRASOUND IMAGING | Facility: CLINIC | Age: 34
End: 2024-01-26
Attending: OBSTETRICS & GYNECOLOGY
Payer: COMMERCIAL

## 2024-01-26 DIAGNOSIS — N92.6 IRREGULAR MENSES: ICD-10-CM

## 2024-01-26 PROCEDURE — 76856 US EXAM PELVIC COMPLETE: CPT | Performed by: OBSTETRICS & GYNECOLOGY

## 2024-01-26 PROCEDURE — 76830 TRANSVAGINAL US NON-OB: CPT | Performed by: OBSTETRICS & GYNECOLOGY

## 2024-02-14 PROBLEM — N91.2 ANOVULATORY AMENORRHEA: Status: ACTIVE | Noted: 2024-02-14

## 2024-04-03 ENCOUNTER — OFFICE VISIT (OUTPATIENT)
Dept: INTERNAL MEDICINE | Facility: CLINIC | Age: 34
End: 2024-04-03
Payer: COMMERCIAL

## 2024-04-03 VITALS
TEMPERATURE: 97.8 F | WEIGHT: 293 LBS | HEIGHT: 63 IN | HEART RATE: 123 BPM | DIASTOLIC BLOOD PRESSURE: 72 MMHG | SYSTOLIC BLOOD PRESSURE: 128 MMHG | OXYGEN SATURATION: 97 % | RESPIRATION RATE: 20 BRPM | BODY MASS INDEX: 51.91 KG/M2

## 2024-04-03 DIAGNOSIS — E66.813 CLASS 3 SEVERE OBESITY DUE TO EXCESS CALORIES WITHOUT SERIOUS COMORBIDITY WITH BODY MASS INDEX (BMI) OF 60.0 TO 69.9 IN ADULT (H): Chronic | ICD-10-CM

## 2024-04-03 DIAGNOSIS — F33.1 MODERATE EPISODE OF RECURRENT MAJOR DEPRESSIVE DISORDER (H): ICD-10-CM

## 2024-04-03 DIAGNOSIS — R20.0 NUMBNESS: ICD-10-CM

## 2024-04-03 DIAGNOSIS — E66.01 CLASS 3 SEVERE OBESITY DUE TO EXCESS CALORIES WITHOUT SERIOUS COMORBIDITY WITH BODY MASS INDEX (BMI) OF 60.0 TO 69.9 IN ADULT (H): Chronic | ICD-10-CM

## 2024-04-03 DIAGNOSIS — F41.9 ANXIETY: Primary | ICD-10-CM

## 2024-04-03 DIAGNOSIS — N91.2 ANOVULATORY AMENORRHEA: ICD-10-CM

## 2024-04-03 PROCEDURE — 99215 OFFICE O/P EST HI 40 MIN: CPT | Performed by: INTERNAL MEDICINE

## 2024-04-03 ASSESSMENT — PATIENT HEALTH QUESTIONNAIRE - PHQ9
SUM OF ALL RESPONSES TO PHQ QUESTIONS 1-9: 13
SUM OF ALL RESPONSES TO PHQ QUESTIONS 1-9: 13
10. IF YOU CHECKED OFF ANY PROBLEMS, HOW DIFFICULT HAVE THESE PROBLEMS MADE IT FOR YOU TO DO YOUR WORK, TAKE CARE OF THINGS AT HOME, OR GET ALONG WITH OTHER PEOPLE: VERY DIFFICULT

## 2024-04-03 NOTE — PROGRESS NOTES
"Face to face time with patient: 64 minutes (1327---1431)       Assessment & Plan   (F41.9) Anxiety  (primary encounter diagnosis)  (F33.1) Moderate episode of recurrent major depressive disorder (H)  Comment: Will likely start the patient on Effexor XR--will defer on starting this immediately to give her a short time to consider. She declines meeting with a therapist. Discussed options of referral to therapist and/or a mental health prescriber--she would like to defer.     (R20.0) Numbness  Comment: Symptoms do not suggest significant neurologic pathology per extensive neurologic evaluation. Reassurance provided, while validating that her symptoms are authentic.     (E66.01,  Z68.44) Class 3 severe obesity due to excess calories without serious comorbidity with body mass index (BMI) of 60.0 to 69.9 in adult (H)  Comment: Chronic stable condition. Discussed some eating/exercise options.     (N91.2) Anovulatory amenorrhea  Comment: Continue evaluation with OB-GYN and endocrinology as planned.       Follow up with me is scheduled for 6/7/2024.       Adelina Simmons is a 33 year old, presenting for the following health issues:  Follow Up      4/3/2024    12:49 PM   Additional Questions   Roomed by Roxane DAVIS CMA     History of Present Illness       Reason for visit:  Standard visit (as far as i know)    She eats 0-1 servings of fruits and vegetables daily.She consumes 0 sweetened beverage(s) daily.She exercises with enough effort to increase her heart rate 9 or less minutes per day.  She exercises with enough effort to increase her heart rate 3 or less days per week.   She is taking medications regularly.       Patient follows up from our initial visit on 12/5/2023 for diffuse \"numbness\" symptoms, and for amenorrhea felt likely to be due to polycystic ovary syndrome.     Patient has seen Neurology (Argelia Rivera) on 1/3/2024, and OB/GYN (Xavier Woodard) on 1/12/2024. She accepts Dr Rivera's explanation that her symptoms " "do not correspond to a neuroanatomical correlate.   She also accepts that anxiety plays a significant role in her symptomatology, which we discussed need not require her to accept that her symptoms are \"imagined\" and not really experienced by her.     We reviewed in detail much of her prior history of stresses in her life, diagnoses of anxiety and depression, and prior medications and therapy. Previous meds have included citalopram, lamictal, bupropion.   Upon review, she has been documented to have taken Effexor in the past, and after some discussion she is agreeable to give this medication another try.   We discussed the common initial adverse effects that many patients will experience (eg constitutional and GI symptoms), and she is urged and encouraged to do her best to continue to take this medication.     She has seen Nancy Saravia of [facility] in the past. She would like to defer setting up any follow up for \"talk therapy\".     Regarding irregular menses, she has consulted with Dr Woodard of OB-GYN on 1/12/2024. Dr Woodard's note suggests that she likely has chronic anovulation due to polycystic ovary syndrome. Plans were for some lab testing and pelvic ultrasound to assess endometrial stripe thickness.   The patient is scheduled to see endocrinology on 5/28/2024.     Past medical, family and social histories as well as medications reviewed and updated as needed.    REVIEW OF SYSTEMS: The following systems have been completely reviewed and are negative except as noted above:   Constitutional, respiratory, cardiovascular, psychiatric, and neurologic systems.        Objective    /72 (BP Location: Other (Comment), Patient Position: Sitting, Cuff Size: Adult Large)   Pulse (!) 123   Temp 97.8  F (36.6  C) (Tympanic)   Resp 20   Ht 1.6 m (5' 3\")   Wt (!) 161 kg (355 lb)   LMP 03/13/2024 (Exact Date)   SpO2 97%   Breastfeeding No   BMI 62.89 kg/m    Body mass index is 62.89 " kg/m .    Physical Exam   GENERAL: alert and no distress  EYES: Eyes grossly normal to inspection, PERRL and conjunctivae and sclerae normal  RESP: lungs clear to auscultation - no rales, rhonchi or wheezes  CV: regular rate and rhythm, normal S1 S2, no S3 or S4, no murmur, click or rub, no peripheral edema  MS: no gross musculoskeletal defects noted, no edema  PSYCH: mentation appears normal, affect normal/bright            Signed Electronically by: Triston Licona MD,

## 2024-04-05 ENCOUNTER — MYC MEDICAL ADVICE (OUTPATIENT)
Dept: INTERNAL MEDICINE | Facility: CLINIC | Age: 34
End: 2024-04-05

## 2024-04-05 DIAGNOSIS — F33.1 MODERATE EPISODE OF RECURRENT MAJOR DEPRESSIVE DISORDER (H): ICD-10-CM

## 2024-04-05 DIAGNOSIS — F41.9 ANXIETY: Primary | ICD-10-CM

## 2024-04-19 RX ORDER — VENLAFAXINE HYDROCHLORIDE 37.5 MG/1
TABLET, EXTENDED RELEASE ORAL
Qty: 120 TABLET | Refills: 0 | Status: SHIPPED | OUTPATIENT
Start: 2024-04-19 | End: 2024-04-25

## 2024-04-19 NOTE — TELEPHONE ENCOUNTER
Please see most recent myc message and advise.  Thanks!           Dr. Licona, the prescription issue was that my insurance will only cover one capsule per day, regardless of strength, so I currently have seven 37.5 venlafaxine ER capsules, and you will need to call in a new prescription for 75 (and so on) when we increase. Was it meant to be the ER to start with? I am planning to start it on Monday as I have some stuff I need to do this weekend and if I'm going to have side effects I would prefer to have them after I do that stuff.

## 2024-04-19 NOTE — TELEPHONE ENCOUNTER
Called patient and spoke with a man who stated patient is currently asleep.      Requested patient to call the clinic back.  Please assist with scheduling her for a virtual appointment with primary care provider in 4-6 weeks.  Thanks!

## 2024-04-19 NOTE — TELEPHONE ENCOUNTER
Called patient and assisted in scheduling a virtual appointment for medication follow up with primary care provider.     Next 5 appointments (look out 90 days)      Jun 07, 2024  3:00 PM  (Arrive by 2:55 PM)  Provider Visit with Triston Licona MD  [facility] ([facility] ) [address]  [address]  [facility] [id number] [phone]

## 2024-04-19 NOTE — TELEPHONE ENCOUNTER
Sent patient a [employer] message.   E-prescribed Rx.      Please contact her to assist her in scheduling a video visit in about 4-6 weeks.

## 2024-04-25 RX ORDER — VENLAFAXINE HYDROCHLORIDE 75 MG/1
75 TABLET, EXTENDED RELEASE ORAL DAILY
Qty: 7 TABLET | Refills: 1 | Status: SHIPPED | OUTPATIENT
Start: 2024-04-25 | End: 2024-04-25

## 2024-04-25 RX ORDER — VENLAFAXINE HYDROCHLORIDE 75 MG/1
75 TABLET, EXTENDED RELEASE ORAL DAILY
Qty: 14 TABLET | Refills: 1 | Status: SHIPPED | OUTPATIENT
Start: 2024-04-25 | End: 2024-05-14 | Stop reason: ALTCHOICE

## 2024-04-29 ENCOUNTER — TELEPHONE (OUTPATIENT)
Dept: INTERNAL MEDICINE | Facility: CLINIC | Age: 34
End: 2024-04-29
Payer: COMMERCIAL

## 2024-04-29 DIAGNOSIS — F41.9 ANXIETY: Primary | ICD-10-CM

## 2024-04-29 DIAGNOSIS — F33.1 MODERATE EPISODE OF RECURRENT MAJOR DEPRESSIVE DISORDER (H): ICD-10-CM

## 2024-04-29 NOTE — TELEPHONE ENCOUNTER
Note: Due to record-high volumes, our turn-around time is taking longer than usual . We are currently 10 business days behind in the pools.   We are working diligently to submit all requests in a timely manner and in the order they are received. Please only flag TRUE URGENT requests as high priority to the pool at this time.   If you have questions - please send a note/message in the active PA encounter and send back to the [facility] PA pool [[id number]].    If you have more specific questions about our process please reach out to our supervisor Argelia Pena.   Thank you!   RPPA (Retail Pharmacy Prior Authorization) team

## 2024-05-06 ENCOUNTER — MYC MEDICAL ADVICE (OUTPATIENT)
Dept: INTERNAL MEDICINE | Facility: CLINIC | Age: 34
End: 2024-05-06

## 2024-05-10 ENCOUNTER — OFFICE VISIT (OUTPATIENT)
Dept: URGENT CARE | Facility: URGENT CARE | Age: 34
End: 2024-05-10
Payer: COMMERCIAL

## 2024-05-10 VITALS
DIASTOLIC BLOOD PRESSURE: 81 MMHG | SYSTOLIC BLOOD PRESSURE: 150 MMHG | OXYGEN SATURATION: 97 % | TEMPERATURE: 97.1 F | HEART RATE: 105 BPM

## 2024-05-10 DIAGNOSIS — S99.929A INJURY OF GREAT TOENAIL: Primary | ICD-10-CM

## 2024-05-10 PROCEDURE — 99212 OFFICE O/P EST SF 10 MIN: CPT | Performed by: FAMILY MEDICINE

## 2024-05-10 NOTE — PROGRESS NOTES
SUBJECTIVE: Iris Toscano is a 33 year old female presenting with a chief complaint of injury rt great toenail with small crack.    Past Medical History:   Diagnosis Date    Anxiety     Asperger's syndrome     Depressive disorder      No Known Allergies  Social History     Tobacco Use    Smoking status: Never    Smokeless tobacco: Never   Substance Use Topics    Alcohol use: Never       ROS:  SKIN: no rash    OBJECTIVE:  BP (!) 150/81   Pulse 105   Temp 97.1  F (36.2  C) (Tympanic)   LMP 03/13/2024 (Exact Date)   SpO2 97% GENERAL APPEARANCE: healthy, alert and no distress  NEURO: Normal strength and tone, sensory exam grossly normal,  normal speech and mentation  SKIN: small great toenail crack      ICD-10-CM    1. Injury of great toenail  S99.929A Orthopedic  Referral        Band-Aid    Fluids/Rest, f/u if worse/not any better

## 2024-05-13 NOTE — TELEPHONE ENCOUNTER
Retail Pharmacy Prior Authorization Team   Phone: [phone]    PA Initiation    Medication: VENLAFAXINE HCL ER 75 MG PO TB24  Insurance Company: [employer] PMA - Phone [phone] Fax [phone]  Pharmacy Filling the Rx: [id number] [place], [place] [facility] [address] [facility] [address]  Filling Pharmacy Phone: [phone]  Filling Pharmacy Fax:    Start Date: 5/13/2024    Can the patient take capsules?  Most insurances cover up to 1 capsule per day.  If so, please send a new prescription to the pharmacy.  If not, please give medical justification as to why capsules would not be as effective and route back to me.

## 2024-05-13 NOTE — TELEPHONE ENCOUNTER
Called and spoke with patient to relay provider     Patient says she can take capsules, but she prefers tablets so she can cut them in half. Patient is taking 150 mg daily based on provider's advise to go up to 150mg after taking 75mg for one week.     Thank you,  Will Bernal, Triage RN [facility]  11:07 AM 5/13/2024

## 2024-05-13 NOTE — TELEPHONE ENCOUNTER
Recommend that she remain on 150 mg daily until next appt with me.     She may use either capsules or tablets for this, but needs to be aware that there may be a cost differential if her insurance dictates.

## 2024-05-13 NOTE — TELEPHONE ENCOUNTER
RN: I believe that she may take capsules.   Please check with patient, also find out what dose she is currently taking, how she is tolerating med, and what is her understanding about the plan for timing of next dose increase.

## 2024-05-13 NOTE — TELEPHONE ENCOUNTER
Medication prior authorization denied, see rationale below and advise.    Per prior authorization team:  Can the patient take capsules? Most insurances cover up to 1 capsule per day. If so, please send a new prescription to the pharmacy. If not, please give medical justification as to why capsules would not be as effective and route back to me.     Thank you.     Will Bernal, Triage RN [facility]  7:44 AM 5/13/2024

## 2024-05-14 RX ORDER — VENLAFAXINE HYDROCHLORIDE 150 MG/1
150 CAPSULE, EXTENDED RELEASE ORAL DAILY
Qty: 30 CAPSULE | Refills: 2 | Status: SHIPPED | OUTPATIENT
Start: 2024-05-14 | End: 2024-06-07

## 2024-05-14 NOTE — TELEPHONE ENCOUNTER
Patient OK with capsule at 150 mg dose. Please send new prescription, patient is all out of medication for a week.     Michelle AMARO

## 2024-05-20 ENCOUNTER — OFFICE VISIT (OUTPATIENT)
Dept: ENDOCRINOLOGY | Facility: CLINIC | Age: 34
End: 2024-05-20
Attending: OBSTETRICS & GYNECOLOGY
Payer: COMMERCIAL

## 2024-05-20 VITALS
TEMPERATURE: 98.8 F | HEIGHT: 63 IN | HEART RATE: 108 BPM | WEIGHT: 293 LBS | OXYGEN SATURATION: 95 % | RESPIRATION RATE: 20 BRPM | SYSTOLIC BLOOD PRESSURE: 133 MMHG | BODY MASS INDEX: 51.91 KG/M2 | DIASTOLIC BLOOD PRESSURE: 82 MMHG

## 2024-05-20 DIAGNOSIS — L68.0 HIRSUTISM: ICD-10-CM

## 2024-05-20 DIAGNOSIS — E66.01 MORBID OBESITY (H): ICD-10-CM

## 2024-05-20 PROCEDURE — 99204 OFFICE O/P NEW MOD 45 MIN: CPT | Performed by: PHYSICIAN ASSISTANT

## 2024-05-20 NOTE — PROGRESS NOTES
Assessment/Plan :   Hirsutism. We discussed the possible causes of hirsutism and oligomenorrhea. We also reviewed her recent laboratory results. Her testosterone level was within normal limits but her SHBG was low. This is most likely due to excess weight. The low SHBG may contribute to hormonal imbalance and the hirsutism. However, I am also worried about the possibility of adrenal hyperplasia or Cushings. She does struggle with weight gain and she her blood pressure has been elevated. We will get an early morning blood draw that will include 17 hydroxyprogesterone, cortisol, and a repeat of the prolactin and TSH. I will contact her with the results and follow-up testing or medication options. Overall, she is not overly concerned with the hirsutism. If her labs are normal, she will continue to shave. She will also discuss options regarding weight loss with her primary care provider.       I have independently reviewed and interpreted labs, imaging as indicated.      Chief complaint:  Iris is a 33 year old female seen in consultation at the request of Dr. Licona for hirsutism.     I have reviewed Care Everywhere including [facility], [facility] , [facility], [place] lab reports, imaging reports and provider notes as indicated.      HISTORY OF PRESENT ILLNESS  Iris states that she has had facial hair since she was a teenager. She started to develop hair on her upper lip shortly after puberty. The hair has increased with age. She used to just have hair on her upper lip but now she has some on her chin and below her lower lip. She has also noticed some on her cheeks. The hair is not overly distressing. She doesn't mind shaving. She also states that her mother also had hair on her lip and chin until she underwent electrolysis.     Iris also has a history of oligomenorrhea. She was told that she has PCOS but she doesn't have any evidence of ovarian cysts.  She also has no history of elevated testosterone or DHEA. She does have a low sex hormone binding globulin. Overall, she is not overly concerned about the hair on her face but she would like to make sure that it is not indicative of any greater problems. Aside from the hirsutism, she also has a history of obesity and she recently has had a few elevated blood pressure readings.     Endocrine relevant labs are as follows:   Latest Reference Range & Units 01/12/24 15:15   DHEA Sulfate 35 - 430 ug/dL 218      Latest Reference Range & Units 01/12/24 15:15   Free Testosterone Calculated ng/dL 0.38      Latest Reference Range & Units 01/12/24 15:15   Testosterone Total 8 - 60 ng/dL 15      Latest Reference Range & Units 01/12/24 15:15   TSH 0.30 - 4.20 uIU/mL 3.77     REVIEW OF SYSTEMS    Endocrine: positive for obesity and hirsutism  Skin: positive for hair growth on face  Eyes: negative for, visual blurring, redness, tearing  Ears/Nose/Throat: negative  Respiratory: No shortness of breath, dyspnea on exertion, cough, or hemoptysis  Cardiovascular: negative for, chest pain, lower extremity edema, and exercise intolerance  Gastrointestinal: negative for, nausea, vomiting, constipation, and diarrhea  Genitourinary: positive for abnormal menstrual cycles, negative for, nocturia, and dysuria  Musculoskeletal: negative for, muscular weakness, nocturnal cramping, and foot pain  Neurologic: negative for, local weakness, numbness or tingling of hands, and numbness or tingling of feet  Psychiatric: negative  Hematologic/Lymphatic/Immunologic: negative    Past Medical History  Past Medical History:   Diagnosis Date    Anxiety     Asperger's syndrome     Depressive disorder        Medications  Current Outpatient Medications   Medication Sig Dispense Refill    medroxyPROGESTERone (PROVERA) 5 MG tablet Take 1 pill on Day #1-12 each month. 36 tablet 4    venlafaxine (EFFEXOR XR) 150 MG 24 hr capsule Take 1 capsule (150 mg) by mouth  "daily 30 capsule 2       Allergies  No Known Allergies      Family History  family history includes Breast Cancer in her maternal grandmother and mother; C.A.D. in her maternal grandmother; Cancer in her maternal aunt and mother; Cerebrovascular Disease in her maternal grandmother; Depression in her maternal aunt; Diabetes in her maternal grandfather; Hypertension in her maternal grandmother; Lipids in her father, mother, sister, and another family member; Thyroid Disease in her mother.    Social History  Social History     Tobacco Use    Smoking status: Never    Smokeless tobacco: Never   Vaping Use    Vaping status: Never Used   Substance Use Topics    Alcohol use: Never    Drug use: Never       Physical Exam  BP (!) 174/94 (BP Location: Left arm, Patient Position: Chair, Cuff Size: Adult Regular)   Pulse 108   Temp 98.8  F (37.1  C) (Tympanic)   Resp 20   Ht 1.6 m (5' 2.99\")   Wt (!) 161.4 kg (355 lb 14.4 oz)   LMP 05/17/2024 (Exact Date)   SpO2 95%   Breastfeeding No   BMI 63.06 kg/m    Body mass index is 63.06 kg/m .  GENERAL :  In no apparent distress  SKIN: Normal color, normal temperature, texture.  No alopecia or purple striae.  Hirsutism on upper lip and chin.   EYES: PERRL, EOMI, No scleral icterus,  No proptosis, conjunctival redness, stare, retraction  NECK: No visible masses. No palpable adenopathy, or masses. No carotid bruits.   THYROID:  Normal, nontender, smooth / firm texture,  no nodules, no Bruit   RESP: Lungs clear to auscultation bilaterally  CARDIAC: Regular rate and rhythm, normal S1 S2, without murmurs, rubs or gallops    NEURO: awake, alert, responds appropriately to questions.  Cranial nerves intact.   Moves all extremities; Gait normal.  No tremor of the outstretched hand.    EXTREMITIES: No clubbing, cyanosis or edema.            "

## 2024-05-20 NOTE — PATIENT INSTRUCTIONS
[facility]  Dr Haynes, Endocrinology Department    [address] [facility]  [place] [address]  Appointment [phone]  Fax: [phone]  [place]: Monday - Thursday

## 2024-05-20 NOTE — LETTER
5/20/2024         RE: Iris Toscano  [address] [facility] [address]        Dear Colleague,    Thank you for referring your patient, Iris Toscano, to the [facility]. Please see a copy of my visit note below.    Assessment/Plan :   Hirsutism. We discussed the possible causes of hirsutism and oligomenorrhea. We also reviewed her recent laboratory results. Her testosterone level was within normal limits but her SHBG was low. This is most likely due to excess weight. The low SHBG may contribute to hormonal imbalance and the hirsutism. However, I am also worried about the possibility of adrenal hyperplasia or Cushings. She does struggle with weight gain and she her blood pressure has been elevated. We will get an early morning blood draw that will include 17 hydroxyprogesterone, cortisol, and a repeat of the prolactin and TSH. I will contact her with the results and follow-up testing or medication options. Overall, she is not overly concerned with the hirsutism. If her labs are normal, she will continue to shave. She will also discuss options regarding weight loss with her primary care provider.       I have independently reviewed and interpreted labs, imaging as indicated.      Chief complaint:  Iris is a 33 year old female seen in consultation at the request of Dr. Licona for hirsutism.     I have reviewed Care Everywhere including [facility], [facility], [facility], [facility], [facility] , [facility], [place] lab reports, imaging reports and provider notes as indicated.      HISTORY OF PRESENT ILLNESS  Iris states that she has had facial hair since she was a teenager. She started to develop hair on her upper lip shortly after puberty. The hair has increased with age. She used to just have hair on her upper lip but now she has some on her chin and below her lower lip. She has also noticed some on her cheeks. The hair is not overly distressing.  She doesn't mind shaving. She also states that her mother also had hair on her lip and chin until she underwent electrolysis.     Iris also has a history of oligomenorrhea. She was told that she has PCOS but she doesn't have any evidence of ovarian cysts. She also has no history of elevated testosterone or DHEA. She does have a low sex hormone binding globulin. Overall, she is not overly concerned about the hair on her face but she would like to make sure that it is not indicative of any greater problems. Aside from the hirsutism, she also has a history of obesity and she recently has had a few elevated blood pressure readings.     Endocrine relevant labs are as follows:   Latest Reference Range & Units 01/12/24 15:15   DHEA Sulfate 35 - 430 ug/dL 218      Latest Reference Range & Units 01/12/24 15:15   Free Testosterone Calculated ng/dL 0.38      Latest Reference Range & Units 01/12/24 15:15   Testosterone Total 8 - 60 ng/dL 15      Latest Reference Range & Units 01/12/24 15:15   TSH 0.30 - 4.20 uIU/mL 3.77     REVIEW OF SYSTEMS    Endocrine: positive for obesity and hirsutism  Skin: positive for hair growth on face  Eyes: negative for, visual blurring, redness, tearing  Ears/Nose/Throat: negative  Respiratory: No shortness of breath, dyspnea on exertion, cough, or hemoptysis  Cardiovascular: negative for, chest pain, lower extremity edema, and exercise intolerance  Gastrointestinal: negative for, nausea, vomiting, constipation, and diarrhea  Genitourinary: positive for abnormal menstrual cycles, negative for, nocturia, and dysuria  Musculoskeletal: negative for, muscular weakness, nocturnal cramping, and foot pain  Neurologic: negative for, local weakness, numbness or tingling of hands, and numbness or tingling of feet  Psychiatric: negative  Hematologic/Lymphatic/Immunologic: negative    Past Medical History  Past Medical History:   Diagnosis Date     Anxiety      Asperger's syndrome      Depressive disorder   "      Medications  Current Outpatient Medications   Medication Sig Dispense Refill     medroxyPROGESTERone (PROVERA) 5 MG tablet Take 1 pill on Day #1-12 each month. 36 tablet 4     venlafaxine (EFFEXOR XR) 150 MG 24 hr capsule Take 1 capsule (150 mg) by mouth daily 30 capsule 2       Allergies  No Known Allergies      Family History  family history includes Breast Cancer in her maternal grandmother and mother; C.A.D. in her maternal grandmother; Cancer in her maternal aunt and mother; Cerebrovascular Disease in her maternal grandmother; Depression in her maternal aunt; Diabetes in her maternal grandfather; Hypertension in her maternal grandmother; Lipids in her father, mother, sister, and another family member; Thyroid Disease in her mother.    Social History  Social History     Tobacco Use     Smoking status: Never     Smokeless tobacco: Never   Vaping Use     Vaping status: Never Used   Substance Use Topics     Alcohol use: Never     Drug use: Never       Physical Exam  BP (!) 174/94 (BP Location: Left arm, Patient Position: Chair, Cuff Size: Adult Regular)   Pulse 108   Temp 98.8  F (37.1  C) (Tympanic)   Resp 20   Ht 1.6 m (5' 2.99\")   Wt (!) 161.4 kg (355 lb 14.4 oz)   LMP 05/17/2024 (Exact Date)   SpO2 95%   Breastfeeding No   BMI 63.06 kg/m    Body mass index is 63.06 kg/m .  GENERAL :  In no apparent distress  SKIN: Normal color, normal temperature, texture.  No alopecia or purple striae.  Hirsutism on upper lip and chin.   EYES: PERRL, EOMI, No scleral icterus,  No proptosis, conjunctival redness, stare, retraction  NECK: No visible masses. No palpable adenopathy, or masses. No carotid bruits.   THYROID:  Normal, nontender, smooth / firm texture,  no nodules, no Bruit   RESP: Lungs clear to auscultation bilaterally  CARDIAC: Regular rate and rhythm, normal S1 S2, without murmurs, rubs or gallops    NEURO: awake, alert, responds appropriately to questions.  Cranial nerves intact.   Moves all " extremities; Gait normal.  No tremor of the outstretched hand.    EXTREMITIES: No clubbing, cyanosis or edema.              Again, thank you for allowing me to participate in the care of your patient.        Sincerely,        Janessa Mckinley PA-C

## 2024-05-30 ENCOUNTER — LAB (OUTPATIENT)
Dept: LAB | Facility: CLINIC | Age: 34
End: 2024-05-30
Payer: COMMERCIAL

## 2024-05-30 DIAGNOSIS — E66.01 MORBID OBESITY (H): ICD-10-CM

## 2024-05-30 DIAGNOSIS — Z11.4 SCREENING FOR HIV (HUMAN IMMUNODEFICIENCY VIRUS): Primary | ICD-10-CM

## 2024-05-30 DIAGNOSIS — Z11.59 NEED FOR HEPATITIS C SCREENING TEST: ICD-10-CM

## 2024-05-30 DIAGNOSIS — L68.0 HIRSUTISM: ICD-10-CM

## 2024-05-30 PROCEDURE — 86803 HEPATITIS C AB TEST: CPT

## 2024-05-30 PROCEDURE — 82533 TOTAL CORTISOL: CPT

## 2024-05-30 PROCEDURE — 36415 COLL VENOUS BLD VENIPUNCTURE: CPT

## 2024-05-30 PROCEDURE — 80048 BASIC METABOLIC PNL TOTAL CA: CPT

## 2024-05-30 PROCEDURE — 80061 LIPID PANEL: CPT

## 2024-05-30 PROCEDURE — 84146 ASSAY OF PROLACTIN: CPT

## 2024-05-30 PROCEDURE — 84443 ASSAY THYROID STIM HORMONE: CPT

## 2024-05-30 PROCEDURE — 83498 ASY HYDROXYPROGESTERONE 17-D: CPT

## 2024-05-30 PROCEDURE — 87389 HIV-1 AG W/HIV-1&-2 AB AG IA: CPT

## 2024-05-31 LAB
ANION GAP SERPL CALCULATED.3IONS-SCNC: 12 MMOL/L (ref 7–15)
BUN SERPL-MCNC: 10.9 MG/DL (ref 6–20)
CALCIUM SERPL-MCNC: 8.9 MG/DL (ref 8.6–10)
CHLORIDE SERPL-SCNC: 106 MMOL/L (ref 98–107)
CHOLEST SERPL-MCNC: 142 MG/DL
CORTIS SERPL-MCNC: 15.9 UG/DL
CREAT SERPL-MCNC: 0.77 MG/DL (ref 0.51–0.95)
DEPRECATED HCO3 PLAS-SCNC: 22 MMOL/L (ref 22–29)
EGFRCR SERPLBLD CKD-EPI 2021: >90 ML/MIN/1.73M2
FASTING STATUS PATIENT QL REPORTED: YES
FASTING STATUS PATIENT QL REPORTED: YES
GLUCOSE SERPL-MCNC: 87 MG/DL (ref 70–99)
HCV AB SERPL QL IA: NONREACTIVE
HDLC SERPL-MCNC: 31 MG/DL
HIV 1+2 AB+HIV1 P24 AG SERPL QL IA: NONREACTIVE
LDLC SERPL CALC-MCNC: 84 MG/DL
NONHDLC SERPL-MCNC: 111 MG/DL
POTASSIUM SERPL-SCNC: 4.6 MMOL/L (ref 3.4–5.3)
PROLACTIN SERPL 3RD IS-MCNC: 24 NG/ML (ref 5–23)
SODIUM SERPL-SCNC: 140 MMOL/L (ref 135–145)
TRIGL SERPL-MCNC: 137 MG/DL
TSH SERPL DL<=0.005 MIU/L-ACNC: 4.78 UIU/ML (ref 0.3–4.2)

## 2024-06-01 ENCOUNTER — HEALTH MAINTENANCE LETTER (OUTPATIENT)
Age: 34
End: 2024-06-01

## 2024-06-02 ASSESSMENT — ANXIETY QUESTIONNAIRES
8. IF YOU CHECKED OFF ANY PROBLEMS, HOW DIFFICULT HAVE THESE MADE IT FOR YOU TO DO YOUR WORK, TAKE CARE OF THINGS AT HOME, OR GET ALONG WITH OTHER PEOPLE?: SOMEWHAT DIFFICULT
1. FEELING NERVOUS, ANXIOUS, OR ON EDGE: SEVERAL DAYS
2. NOT BEING ABLE TO STOP OR CONTROL WORRYING: NOT AT ALL
7. FEELING AFRAID AS IF SOMETHING AWFUL MIGHT HAPPEN: SEVERAL DAYS
IF YOU CHECKED OFF ANY PROBLEMS ON THIS QUESTIONNAIRE, HOW DIFFICULT HAVE THESE PROBLEMS MADE IT FOR YOU TO DO YOUR WORK, TAKE CARE OF THINGS AT HOME, OR GET ALONG WITH OTHER PEOPLE: SOMEWHAT DIFFICULT
5. BEING SO RESTLESS THAT IT IS HARD TO SIT STILL: NOT AT ALL
GAD7 TOTAL SCORE: 7
6. BECOMING EASILY ANNOYED OR IRRITABLE: MORE THAN HALF THE DAYS
3. WORRYING TOO MUCH ABOUT DIFFERENT THINGS: MORE THAN HALF THE DAYS
7. FEELING AFRAID AS IF SOMETHING AWFUL MIGHT HAPPEN: SEVERAL DAYS
4. TROUBLE RELAXING: SEVERAL DAYS
GAD7 TOTAL SCORE: 7

## 2024-06-06 LAB — 17OHP SERPL-MCNC: 28 NG/DL

## 2024-06-07 ENCOUNTER — VIRTUAL VISIT (OUTPATIENT)
Dept: INTERNAL MEDICINE | Facility: CLINIC | Age: 34
End: 2024-06-07
Payer: COMMERCIAL

## 2024-06-07 DIAGNOSIS — E66.01 CLASS 3 SEVERE OBESITY DUE TO EXCESS CALORIES WITHOUT SERIOUS COMORBIDITY WITH BODY MASS INDEX (BMI) OF 60.0 TO 69.9 IN ADULT (H): ICD-10-CM

## 2024-06-07 DIAGNOSIS — E66.813 CLASS 3 SEVERE OBESITY DUE TO EXCESS CALORIES WITHOUT SERIOUS COMORBIDITY WITH BODY MASS INDEX (BMI) OF 60.0 TO 69.9 IN ADULT (H): ICD-10-CM

## 2024-06-07 DIAGNOSIS — F41.9 ANXIETY: Primary | ICD-10-CM

## 2024-06-07 DIAGNOSIS — G47.00 INSOMNIA, UNSPECIFIED TYPE: ICD-10-CM

## 2024-06-07 DIAGNOSIS — F33.1 MODERATE EPISODE OF RECURRENT MAJOR DEPRESSIVE DISORDER (H): ICD-10-CM

## 2024-06-07 PROCEDURE — 99214 OFFICE O/P EST MOD 30 MIN: CPT | Mod: 95 | Performed by: INTERNAL MEDICINE

## 2024-06-07 RX ORDER — VENLAFAXINE HYDROCHLORIDE 150 MG/1
150 CAPSULE, EXTENDED RELEASE ORAL DAILY
Qty: 30 CAPSULE | Refills: 5 | Status: SHIPPED | OUTPATIENT
Start: 2024-06-07

## 2024-06-07 RX ORDER — TRAZODONE HYDROCHLORIDE 50 MG/1
25-100 TABLET, FILM COATED ORAL
Qty: 30 TABLET | Refills: 3 | Status: SHIPPED | OUTPATIENT
Start: 2024-06-07

## 2024-06-07 ASSESSMENT — PATIENT HEALTH QUESTIONNAIRE - PHQ9
10. IF YOU CHECKED OFF ANY PROBLEMS, HOW DIFFICULT HAVE THESE PROBLEMS MADE IT FOR YOU TO DO YOUR WORK, TAKE CARE OF THINGS AT HOME, OR GET ALONG WITH OTHER PEOPLE: VERY DIFFICULT
SUM OF ALL RESPONSES TO PHQ QUESTIONS 1-9: 9
SUM OF ALL RESPONSES TO PHQ QUESTIONS 1-9: 9

## 2024-06-07 NOTE — PROGRESS NOTES
"1525---1550      Iris is a 33 year old who is being evaluated via a billable video visit.    How would you like to obtain your AVS? MyChart  If the video visit is dropped, the invitation should be resent by: Text to cell phone: [phone]  Will anyone else be joining your video visit? No      Assessment & Plan   (F41.9) Anxiety  (primary encounter diagnosis)  (F33.1) Moderate episode of recurrent major depressive disorder (H)  Comment: We agreed for her to continue current dose  of Effexor XR, and to follow up in about three months.    Plan: venlafaxine (EFFEXOR XR) 150 MG 24 hr capsule          (E66.01,  Z68.44) Class 3 severe obesity due to excess calories without serious comorbidity with body mass index (BMI) of 60.0 to 69.9 in adult (H)  Comment: She expresses some interest in a formal weight loss program, perhaps a medication or a less invasive surgical approach. She is content to defer this decision.     (G47.00) Insomnia, unspecified type  Comment: Refilled Trazodone.   Plan: traZODone (DESYREL) 50 MG tablet            BMI  Estimated body mass index is 63.06 kg/m  as calculated from the following:    Height as of 5/20/24: 1.6 m (5' 2.99\").    Weight as of 5/20/24: 161.4 kg (355 lb 14.4 oz).   Weight management plan: Discussed options including semaglutide (subcutaneous or po) or surgical options.      Patient Instructions   Continue the same doses of venlafaxine and trazodone.   Refills sent to your pharmacy.     Let me know if interested in medications or consults for weight reduction.     Recommend a follow up video appointment in about three months.         Subjective   Iris is a 33 year old, presenting for the following health issues:  Follow Up (medication)      6/7/2024     2:08 PM   Additional Questions   Roomed by Roxane DAVIS CMA     Video Start Time: 1525    History of Present Illness       Mental Health Follow-up:  Patient presents to follow-up on Depression & Anxiety.Patient's depression since " "last visit has been:  Better  The patient is having other symptoms associated with depression.  Patient's anxiety since last visit has been:  Better  The patient is having other symptoms associated with anxiety.  Any significant life events: other  Patient is feeling anxious or having panic attacks.  Patient has no concerns about alcohol or drug use.    She eats 0-1 servings of fruits and vegetables daily.She consumes 0 sweetened beverage(s) daily.She exercises with enough effort to increase her heart rate 9 or less minutes per day.  She exercises with enough effort to increase her heart rate 3 or less days per week.   She is taking medications regularly.     The patient has now taken Effexor XR since around mid-April. No significant adverse effects reported.   She does feel as though she \"gets more stuff done\", \"things don't feel impossible\", she has \"maintained a [daily] shower schedule for three weeks\", and she is \"not crying as much\".     She feels as though the anxiety is \"muted\" but not gone. Her sister has been at home recently and they may have gotten along better than previous.     Past medical, family and social histories as well as medications reviewed and updated as needed.    REVIEW OF SYSTEMS: The following systems have been completely reviewed and are negative except as noted above:   Constitutional, endocrine, psychiatric, and neurologic systems.        Objective           Vitals:  No vitals were obtained today due to virtual visit.    Physical Exam   GENERAL: alert and no distress  EYES: Eyes grossly normal to inspection.  No discharge or erythema, or obvious scleral/conjunctival abnormalities.  RESP: No audible wheeze, cough, or visible cyanosis.    SKIN: Visible skin clear. No significant rash, abnormal pigmentation or lesions.  NEURO: Cranial nerves grossly intact.  Mentation and speech appropriate for age.  PSYCH: Appropriate affect, tone, and pace of words        Video-Visit Details    Type of " service:  Video Visit   Video End Time: 1550  Originating Location (pt. Location): Home    Distant Location (provider location):  On-site  Platform used for Video Visit: Jesús  Signed Electronically by: Triston Licona MD,

## 2024-06-11 NOTE — PATIENT INSTRUCTIONS
Continue the same doses of venlafaxine and trazodone.   Refills sent to your pharmacy.     Let me know if interested in medications or consults for weight reduction.     Recommend a follow up video appointment in about three months.

## 2024-08-14 ENCOUNTER — MYC MEDICAL ADVICE (OUTPATIENT)
Dept: INTERNAL MEDICINE | Facility: CLINIC | Age: 34
End: 2024-08-14
Payer: COMMERCIAL

## 2024-08-15 ENCOUNTER — OFFICE VISIT (OUTPATIENT)
Dept: INTERNAL MEDICINE | Facility: CLINIC | Age: 34
End: 2024-08-15
Payer: COMMERCIAL

## 2024-08-15 VITALS
BODY MASS INDEX: 51.91 KG/M2 | HEIGHT: 63 IN | WEIGHT: 293 LBS | SYSTOLIC BLOOD PRESSURE: 130 MMHG | DIASTOLIC BLOOD PRESSURE: 82 MMHG | OXYGEN SATURATION: 95 % | TEMPERATURE: 97.2 F | RESPIRATION RATE: 20 BRPM | HEART RATE: 90 BPM

## 2024-08-15 DIAGNOSIS — L50.9 HIVES: Primary | ICD-10-CM

## 2024-08-15 DIAGNOSIS — R73.9 ELEVATED RANDOM BLOOD GLUCOSE LEVEL: ICD-10-CM

## 2024-08-15 LAB — HBA1C MFR BLD: 5.2 % (ref 0–5.6)

## 2024-08-15 PROCEDURE — 36415 COLL VENOUS BLD VENIPUNCTURE: CPT

## 2024-08-15 PROCEDURE — 86364 TISS TRNSGLTMNASE EA IG CLAS: CPT

## 2024-08-15 PROCEDURE — 86003 ALLG SPEC IGE CRUDE XTRC EA: CPT

## 2024-08-15 PROCEDURE — 83036 HEMOGLOBIN GLYCOSYLATED A1C: CPT

## 2024-08-15 PROCEDURE — 99214 OFFICE O/P EST MOD 30 MIN: CPT

## 2024-08-15 RX ORDER — PREDNISONE 10 MG/1
TABLET ORAL
Qty: 20 TABLET | Refills: 0 | Status: SHIPPED | OUTPATIENT
Start: 2024-08-15

## 2024-08-15 ASSESSMENT — PATIENT HEALTH QUESTIONNAIRE - PHQ9
SUM OF ALL RESPONSES TO PHQ QUESTIONS 1-9: 8
SUM OF ALL RESPONSES TO PHQ QUESTIONS 1-9: 8
10. IF YOU CHECKED OFF ANY PROBLEMS, HOW DIFFICULT HAVE THESE PROBLEMS MADE IT FOR YOU TO DO YOUR WORK, TAKE CARE OF THINGS AT HOME, OR GET ALONG WITH OTHER PEOPLE: VERY DIFFICULT

## 2024-08-15 ASSESSMENT — PAIN SCALES - GENERAL: PAINLEVEL: NO PAIN (0)

## 2024-08-15 NOTE — PROGRESS NOTES
Assessment & Plan     Hives  Patient presents to the clinic with complaint of hives covering her entire body. Patient states it has been there for a week or so. No new foods, environmental factors, lotions or soaps. Will prescribe a steroid taper and have her follow-up with allergy. Will rule out dairy allergy and gluten allergy today  - predniSONE (DELTASONE) 10 MG tablet; Take 3 tabs by mouth daily x 3 days, then 2 tabs daily x 3 days, then 1 tab daily x 3 days, then 1/2 tab daily x 3 days.  - Adult Allergy/Asthma  Referral; Future  - Tissue transglutaminase elpidio IgA and IgG; Future  - Allergen milk IgE; Future  - Tissue transglutaminase elpidio IgA and IgG  - Allergen milk IgE    Elevated random blood glucose level  Patient would like to be screened for diabetes.   - Hemoglobin A1c; Future  - Hemoglobin A1c      30 minutes spent by me on the date of the encounter doing chart review, review of test results, interpretation of tests, patient visit, and documentation             Subjective   Iris is a 34 year old, presenting for the following health issues:  Patient is here for a complaint of hives all over her body. Has been like this for over a week.   No new soaps, laundry detergents, no new lotions.   She thought maybe it was from   Not sure of dairy or gluten allergy    Patient states she has had some increase thirst and night time wakings. Should would like to check her hgb A1c  Hives      8/15/2024    10:04 AM   Additional Questions   Roomed by iWlda Mixon   Accompanied by self         8/15/2024    10:04 AM   Patient Reported Additional Medications   Patient reports taking the following new medications no     History of Present Illness       Reason for visit:  Hives  Symptom onset:  1-2 weeks ago  Symptoms include:  Hives  Symptom intensity:  Moderate  Symptom progression:  Worsening  Had these symptoms before:  No  What makes it worse:  N/a  What makes it better:  N/a    She eats 0-1 servings of  fruits and vegetables daily.She consumes 0 sweetened beverage(s) daily.She exercises with enough effort to increase her heart rate 9 or less minutes per day.  She exercises with enough effort to increase her heart rate 3 or less days per week.   She is taking medications regularly.                 Review of Systems  Constitutional, HEENT, cardiovascular, pulmonary, gi and gu systems are negative, except as otherwise noted.      Objective    LMP 07/30/2024 (Approximate)   There is no height or weight on file to calculate BMI.  Physical Exam   GENERAL: alert and no distress  SKIN: Hives covering entire body             Signed Electronically by: PARIS Aparicio CNP

## 2024-08-16 LAB — COW MILK IGE QN: <0.1 KU(A)/L

## 2024-08-18 LAB
TTG IGA SER-ACNC: 0.4 U/ML
TTG IGG SER-ACNC: <0.6 U/ML

## 2024-09-02 ENCOUNTER — MYC MEDICAL ADVICE (OUTPATIENT)
Dept: INTERNAL MEDICINE | Facility: CLINIC | Age: 34
End: 2024-09-02
Payer: COMMERCIAL

## 2024-10-09 ENCOUNTER — MYC REFILL (OUTPATIENT)
Dept: INTERNAL MEDICINE | Facility: CLINIC | Age: 34
End: 2024-10-09
Payer: COMMERCIAL

## 2024-10-09 DIAGNOSIS — F33.1 MODERATE EPISODE OF RECURRENT MAJOR DEPRESSIVE DISORDER (H): ICD-10-CM

## 2024-10-09 DIAGNOSIS — F41.9 ANXIETY: ICD-10-CM

## 2024-10-09 RX ORDER — VENLAFAXINE HYDROCHLORIDE 150 MG/1
150 CAPSULE, EXTENDED RELEASE ORAL DAILY
Qty: 30 CAPSULE | Refills: 5 | Status: CANCELLED | OUTPATIENT
Start: 2024-10-09

## 2024-10-15 ENCOUNTER — MYC MEDICAL ADVICE (OUTPATIENT)
Dept: INTERNAL MEDICINE | Facility: CLINIC | Age: 34
End: 2024-10-15
Payer: COMMERCIAL

## 2024-10-15 NOTE — TELEPHONE ENCOUNTER
Patient reports she has refills on file at pharmacy,     Summer RN 10:03 AM October 15, 2024   [facility]

## 2024-10-17 ENCOUNTER — OFFICE VISIT (OUTPATIENT)
Dept: URGENT CARE | Facility: URGENT CARE | Age: 34
End: 2024-10-17
Payer: COMMERCIAL

## 2024-10-17 VITALS
SYSTOLIC BLOOD PRESSURE: 161 MMHG | OXYGEN SATURATION: 100 % | BODY MASS INDEX: 63.97 KG/M2 | TEMPERATURE: 97.9 F | DIASTOLIC BLOOD PRESSURE: 107 MMHG | WEIGHT: 293 LBS | RESPIRATION RATE: 18 BRPM | HEART RATE: 96 BPM

## 2024-10-17 DIAGNOSIS — K29.70 VIRAL GASTRITIS: Primary | ICD-10-CM

## 2024-10-17 PROCEDURE — 99213 OFFICE O/P EST LOW 20 MIN: CPT | Performed by: PHYSICIAN ASSISTANT

## 2024-10-17 RX ORDER — ONDANSETRON 4 MG/1
4 TABLET, ORALLY DISINTEGRATING ORAL EVERY 8 HOURS PRN
Qty: 9 TABLET | Refills: 0 | Status: SHIPPED | OUTPATIENT
Start: 2024-10-17 | End: 2024-10-20

## 2024-10-17 NOTE — PATIENT INSTRUCTIONS
Patient was educated on the natural course of condition which lasts about 7 days. Conservative measures discussed including drinking small amounts of fluids (Pedialyte or Gatorade/water mixture), bland diet, avoidance of dairy products, and analgesics (Tylenol) for pain. Advance diet as tolerated. To avoid transmission wash hands with soap/water and clean external house surfaces with bleach water. See your primary care provider if symptoms do not improve in 7 days. Seek emergency care if you develop worsening abdominal pain or fever over 103.

## 2024-10-17 NOTE — PROGRESS NOTES
URGENT CARE VISIT:    SUBJECTIVE:   Iris Toscano is a 34 year old female who presents with vomiting for 2 days. Nothing makes symptoms better or worse. Associated symptoms include nausea. She denies diarrhea, constipation, dysuria, frequency, fever, chills, and abdominal pain. She has tried none with no relief of symptoms.  Appetite is decreased. Risk factors include none. Abdominal surgical history includes hernia repair.     PMH:   Past Medical History:   Diagnosis Date    Anxiety     Asperger's syndrome     Depressive disorder      Allergies: Patient has no known allergies.  Medications:   Current Outpatient Medications   Medication Sig Dispense Refill    medroxyPROGESTERone (PROVERA) 5 MG tablet Take 1 pill on Day #1-12 each month. 36 tablet 4    ondansetron (ZOFRAN ODT) 4 MG ODT tab Take 1 tablet (4 mg) by mouth every 8 hours as needed for nausea. 9 tablet 0    traZODone (DESYREL) 50 MG tablet Take 0.5-2 tablets ( mg) by mouth nightly as needed for sleep 30 tablet 3    venlafaxine (EFFEXOR XR) 150 MG 24 hr capsule Take 1 capsule (150 mg) by mouth daily 30 capsule 5    predniSONE (DELTASONE) 10 MG tablet Take 3 tabs by mouth daily x 3 days, then 2 tabs daily x 3 days, then 1 tab daily x 3 days, then 1/2 tab daily x 3 days. 20 tablet 0     Social History:   Social History     Socioeconomic History    Marital status: Single     Spouse name: Not on file    Number of children: Not on file    Years of education: Not on file    Highest education level: Not on file   Occupational History    Not on file   Tobacco Use    Smoking status: Never    Smokeless tobacco: Never   Vaping Use    Vaping status: Never Used   Substance and Sexual Activity    Alcohol use: Never    Drug use: Never    Sexual activity: Never     Birth control/protection: None   Other Topics Concern    Not on file   Social History Narrative    FAMILY INFORMATION     Date: January 5, 2011    Parent #1      Name: Damion Toscano   Gender: male    : 1957      Occupation:         Parent #2      Name: Elzbieta Toscano   Gender: female   : 1951     Occupation: Homemaker        Siblings:  Name: Elisabet Toscano    : 1994        Relationship Status of Parent(s):     Who does the child live with? mother, father and sister(s)    What language(s) is/are spoken at home? English    Child's Country of Birth? [place]                  Social Determinants of Health     Financial Resource Strain: Low Risk  (2023)    Financial Resource Strain     Within the past 12 months, have you or your family members you live with been unable to get utilities (heat, electricity) when it was really needed?: No   Food Insecurity: Low Risk  (2023)    Food Insecurity     Within the past 12 months, did you worry that your food would run out before you got money to buy more?: No     Within the past 12 months, did the food you bought just not last and you didn t have money to get more?: No   Transportation Needs: Low Risk  (2023)    Transportation Needs     Within the past 12 months, has lack of transportation kept you from medical appointments, getting your medicines, non-medical meetings or appointments, work, or from getting things that you need?: No   Physical Activity: Not on file   Stress: Not on file   Social Connections: Not on file   Interpersonal Safety: Low Risk  (4/3/2024)    Interpersonal Safety     Do you feel physically and emotionally safe where you currently live?: Yes     Within the past 12 months, have you been hit, slapped, kicked or otherwise physically hurt by someone?: No     Within the past 12 months, have you been humiliated or emotionally abused in other ways by your partner or ex-partner?: No   Housing Stability: Low Risk  (2023)    Housing Stability     Do you have housing? : Yes     Are you worried about losing your housing?: No       ROS: ROS otherwise found to be negative except as noted  above.     OBJECTIVE:  BP (!) 161/107 (BP Location: Right arm, Patient Position: Sitting, Cuff Size: Adult Regular)   Pulse 96   Temp 97.9  F (36.6  C) (Oral)   Resp 18   Wt (!) 163.7 kg (361 lb)   SpO2 100%   BMI 63.97 kg/m    GENERAL APPEARANCE: healthy, alert and no distress  EYES: EOMI,  PERRL, conjunctiva clear  RESP: lungs clear to auscultation - no rales, rhonchi or wheezes  CV: regular rates and rhythm, normal S1 S2, no murmur noted  ABDOMEN:  soft, nontender, no HSM or masses and bowel sounds normal. No CVA TTP.   SKIN: no suspicious lesions or rashes      ASSESSMENT:     ICD-10-CM    1. Viral gastritis  K29.70 ondansetron (ZOFRAN ODT) 4 MG ODT tab           PLAN:  Patient Instructions   Patient was educated on the natural course of condition which lasts about 7 days. Conservative measures discussed including drinking small amounts of fluids (Pedialyte or Gatorade/water mixture), bland diet, avoidance of dairy products, and analgesics (Tylenol) for pain. Advance diet as tolerated. To avoid transmission wash hands with soap/water and clean external house surfaces with bleach water. See your primary care provider if symptoms do not improve in 7 days. Seek emergency care if you develop worsening abdominal pain or fever over 103.     Patient verbalized understanding and is agreeable to plan. The patient was discharged ambulatory and in stable condition.    TRISTAN Dorsey...................  10/17/2024   11:36 AM

## 2024-11-17 ENCOUNTER — OFFICE VISIT (OUTPATIENT)
Dept: URGENT CARE | Facility: URGENT CARE | Age: 34
End: 2024-11-17
Payer: COMMERCIAL

## 2024-11-17 VITALS
RESPIRATION RATE: 16 BRPM | OXYGEN SATURATION: 96 % | HEART RATE: 105 BPM | SYSTOLIC BLOOD PRESSURE: 122 MMHG | DIASTOLIC BLOOD PRESSURE: 84 MMHG | TEMPERATURE: 99.1 F

## 2024-11-17 DIAGNOSIS — N30.01 ACUTE CYSTITIS WITH HEMATURIA: Primary | ICD-10-CM

## 2024-11-17 DIAGNOSIS — R30.0 DYSURIA: ICD-10-CM

## 2024-11-17 LAB
ALBUMIN UR-MCNC: 30 MG/DL
APPEARANCE UR: ABNORMAL
BACTERIA #/AREA URNS HPF: ABNORMAL /HPF
BILIRUB UR QL STRIP: NEGATIVE
CLUE CELLS: ABNORMAL
COLOR UR AUTO: YELLOW
GLUCOSE UR STRIP-MCNC: NEGATIVE MG/DL
HGB UR QL STRIP: ABNORMAL
KETONES UR STRIP-MCNC: NEGATIVE MG/DL
LEUKOCYTE ESTERASE UR QL STRIP: ABNORMAL
MUCOUS THREADS #/AREA URNS LPF: PRESENT /LPF
NITRATE UR QL: NEGATIVE
PH UR STRIP: 5.5 [PH] (ref 5–7)
RBC #/AREA URNS AUTO: ABNORMAL /HPF
SP GR UR STRIP: 1.02 (ref 1–1.03)
SQUAMOUS #/AREA URNS AUTO: ABNORMAL /LPF
TRICHOMONAS, WET PREP: ABNORMAL
UROBILINOGEN UR STRIP-ACNC: 0.2 E.U./DL
WBC #/AREA URNS AUTO: ABNORMAL /HPF
WBC CLUMPS #/AREA URNS HPF: PRESENT /HPF
WBC'S/HIGH POWER FIELD, WET PREP: ABNORMAL
YEAST, WET PREP: ABNORMAL

## 2024-11-17 PROCEDURE — 87210 SMEAR WET MOUNT SALINE/INK: CPT | Performed by: FAMILY MEDICINE

## 2024-11-17 PROCEDURE — 99213 OFFICE O/P EST LOW 20 MIN: CPT | Performed by: FAMILY MEDICINE

## 2024-11-17 PROCEDURE — 87088 URINE BACTERIA CULTURE: CPT | Performed by: FAMILY MEDICINE

## 2024-11-17 PROCEDURE — 87186 SC STD MICRODIL/AGAR DIL: CPT | Performed by: FAMILY MEDICINE

## 2024-11-17 PROCEDURE — 87086 URINE CULTURE/COLONY COUNT: CPT | Performed by: FAMILY MEDICINE

## 2024-11-17 PROCEDURE — 81001 URINALYSIS AUTO W/SCOPE: CPT | Performed by: FAMILY MEDICINE

## 2024-11-17 RX ORDER — NITROFURANTOIN 25; 75 MG/1; MG/1
100 CAPSULE ORAL 2 TIMES DAILY
Qty: 10 CAPSULE | Refills: 0 | Status: SHIPPED | OUTPATIENT
Start: 2024-11-17 | End: 2024-11-22

## 2024-11-17 NOTE — PATIENT INSTRUCTIONS
Take antibiotic Macrobid/nitrofurantoin twice a day for 5 days    Drink approximately 150 ounces of water a day to stay hydrated.     If you have not seen improvement within 48 hours please call your Doctor's office or return to be evaluated.   If symptoms have worsened seek help right away  If you develop flank pain, fevers, nausea/vomiting seek help right away      Your urine culture should return in the next 2-3 days to confirm if there is an infection.   --if the antibiotic needs to be changed our team will reach out to you to discuss  --if the culture does not specifically grow an organism and your symptoms have resolved it is recommended that you stop the antibiotic

## 2024-11-17 NOTE — PROGRESS NOTES
Assessment & Plan     Dysuria  - UA Macroscopic with reflex to Microscopic and Culture - Clinic Collect  - Wet prep - Clinic Collect  - Urine Microscopic Exam  - Urine Culture    Acute cystitis with hematuria  - nitroFURantoin macrocrystal-monohydrate (MACROBID) 100 MG capsule  Dispense: 10 capsule; Refill: 0     First episode UTI > 20 years. No suggestion of pyelonephritis. 5 day course macrobid provided. Discussed process of urine culture testing and set expectations should symptoms not improve in next couple days reach out to PCP or return to be evaluated.     See AVS summary for additional recommendations reviewed with patient during this visit.       Ramirez Cohen MD   [facility]    Subjective     Iris is a 34 year old female who presents to clinic today for the following health issues:  Chief Complaint   Patient presents with    Dysuria     2.5 week-on and off, urgency, frequency     HPI    Symptoms present on/off for a couple weeks with no symptoms  for days at a time. Last UTI was years ago when she was in elementary school. Has seen blood in urine although just had her period. No suprapubic pain    No fevers/nausea/vomiting    Denies vaginal discomfort/discharge.     LMP 11/12/24    Patient Active Problem List    Diagnosis Date Noted    Anovulatory amenorrhea 02/14/2024     Priority: Medium    Irregular menses 01/12/2024     Priority: Medium    Hirsutism 01/12/2024     Priority: Medium    Numbness 08/13/2022     Priority: Medium    Moderate episode of recurrent major depressive disorder (H) 02/10/2022     Priority: Medium    Anxiety 02/10/2022     Priority: Medium    Asperger's disorder 07/24/2020     Priority: Medium    Class 3 severe obesity due to excess calories without serious comorbidity with body mass index (BMI) of 60.0 to 69.9 in adult (H) 01/28/2013     Priority: Medium    Benign intracranial hypertension 01/28/2013     Priority: Medium    Depressive disorder 02/09/2005      Priority: Medium     Formatting of this note might be different from the original.   With episodic irritability   ; Depressive disorder, not elsewhere classified ([facility])      Attention deficit hyperactivity disorder (ADHD) 10/20/2004     Priority: Medium       Current Outpatient Medications   Medication Sig Dispense Refill    medroxyPROGESTERone (PROVERA) 5 MG tablet Take 1 pill on Day #1-12 each month. 36 tablet 4    nitroFURantoin macrocrystal-monohydrate (MACROBID) 100 MG capsule Take 1 capsule (100 mg) by mouth 2 times daily for 5 days. 10 capsule 0    traZODone (DESYREL) 50 MG tablet Take 0.5-2 tablets ( mg) by mouth nightly as needed for sleep 30 tablet 3    venlafaxine (EFFEXOR XR) 150 MG 24 hr capsule Take 1 capsule (150 mg) by mouth daily 30 capsule 5    predniSONE (DELTASONE) 10 MG tablet Take 3 tabs by mouth daily x 3 days, then 2 tabs daily x 3 days, then 1 tab daily x 3 days, then 1/2 tab daily x 3 days. 20 tablet 0     No current facility-administered medications for this visit.           Objective    /84   Pulse 105   Temp 99.1  F (37.3  C)   Resp 16   LMP 11/12/2024   SpO2 96%   Physical Exam       GEN : NAD  Results for orders placed or performed in visit on 11/17/24   UA Macroscopic with reflex to Microscopic and Culture - Clinic Collect     Status: Abnormal    Specimen: Urine, Clean Catch   Result Value Ref Range    Color Urine Yellow Colorless, Straw, Light Yellow, Yellow    Appearance Urine Slightly Cloudy (A) Clear    Glucose Urine Negative Negative mg/dL    Bilirubin Urine Negative Negative    Ketones Urine Negative Negative mg/dL    Specific Gravity Urine 1.025 1.003 - 1.035    Blood Urine Moderate (A) Negative    pH Urine 5.5 5.0 - 7.0    Protein Albumin Urine 30 (A) Negative mg/dL    Urobilinogen Urine 0.2 0.2, 1.0 E.U./dL    Nitrite Urine Negative Negative    Leukocyte Esterase Urine Moderate (A) Negative   Urine Microscopic Exam     Status: Abnormal   Result Value Ref Range     Bacteria Urine Moderate (A) None Seen /HPF    RBC Urine  (A) 0-2 /HPF /HPF    WBC Urine  (A) 0-5 /HPF /HPF    Squamous Epithelials Urine Moderate (A) None Seen /LPF    WBC Clumps Urine Present (A) None Seen /HPF    Mucus Urine Present (A) None Seen /LPF   Wet prep - Clinic Collect     Status: Abnormal    Specimen: Vagina; Swab   Result Value Ref Range    Trichomonas Absent Absent    Yeast Absent Absent    Clue Cells Absent Absent    WBCs/high power field 1+ (A) None                     The use of Dragon/NaturVentionation services may have been used to construct the content in this note; any grammatical or spelling errors are non-intentional. Please contact the author of this note directly if you are in need of any clarification.

## 2024-11-19 DIAGNOSIS — N30.01 ACUTE CYSTITIS WITH HEMATURIA: Primary | ICD-10-CM

## 2024-11-19 LAB — BACTERIA UR CULT: ABNORMAL

## 2024-11-19 RX ORDER — CEPHALEXIN 500 MG/1
500 CAPSULE ORAL 3 TIMES DAILY
Qty: 21 CAPSULE | Refills: 0 | Status: SHIPPED | OUTPATIENT
Start: 2024-11-19 | End: 2024-11-26

## 2024-12-04 ENCOUNTER — TELEPHONE (OUTPATIENT)
Dept: INTERNAL MEDICINE | Facility: CLINIC | Age: 34
End: 2024-12-04
Payer: COMMERCIAL

## 2024-12-04 NOTE — TELEPHONE ENCOUNTER
Patient Returning Call    Reason for call:  thinks she has carpol tunnel?   Needs to speak with care team    Information relayed to patient:  care team will call    Patient has additional questions:  N/A    What are your questions/concerns:  not able to use hand    Could we send this information to you in [email or web address] or would you prefer to receive a phone call?:   Patient would prefer a phone call   Okay to leave a detailed message?: N/A at Home number on file [phone] ([place])

## 2024-12-04 NOTE — TELEPHONE ENCOUNTER
Painful to make fist with right hand, especially thumb and palm are painful.  She does have intermittent numbness and tingling in right hand. She is assisting her mother who recently had surgery so has been using hands more.  The pain seems to be worse overnights.  No known injury.  Scheduled to see Marbella Thurman tomorrow 12/5/24 arriving at 12:40 p.m.  ENMANUEL Jaramillo R.N.

## 2024-12-05 ENCOUNTER — ANCILLARY PROCEDURE (OUTPATIENT)
Dept: GENERAL RADIOLOGY | Facility: CLINIC | Age: 34
End: 2024-12-05
Payer: COMMERCIAL

## 2024-12-05 ENCOUNTER — OFFICE VISIT (OUTPATIENT)
Dept: INTERNAL MEDICINE | Facility: CLINIC | Age: 34
End: 2024-12-05
Payer: COMMERCIAL

## 2024-12-05 VITALS
SYSTOLIC BLOOD PRESSURE: 112 MMHG | TEMPERATURE: 98.5 F | DIASTOLIC BLOOD PRESSURE: 66 MMHG | WEIGHT: 293 LBS | BODY MASS INDEX: 64.38 KG/M2 | OXYGEN SATURATION: 98 % | RESPIRATION RATE: 16 BRPM | HEART RATE: 109 BPM

## 2024-12-05 DIAGNOSIS — M79.641 PAIN OF RIGHT HAND: Primary | ICD-10-CM

## 2024-12-05 DIAGNOSIS — M79.641 PAIN OF RIGHT HAND: ICD-10-CM

## 2024-12-05 RX ORDER — VENLAFAXINE HYDROCHLORIDE 37.5 MG/1
CAPSULE, EXTENDED RELEASE ORAL
COMMUNITY
Start: 2024-04-19

## 2024-12-05 RX ORDER — AMOXICILLIN 500 MG/1
CAPSULE ORAL
COMMUNITY
Start: 2024-09-26

## 2024-12-05 RX ORDER — VENLAFAXINE HYDROCHLORIDE 75 MG/1
CAPSULE, EXTENDED RELEASE ORAL
COMMUNITY
Start: 2024-04-25

## 2024-12-05 ASSESSMENT — PATIENT HEALTH QUESTIONNAIRE - PHQ9
SUM OF ALL RESPONSES TO PHQ QUESTIONS 1-9: 7
10. IF YOU CHECKED OFF ANY PROBLEMS, HOW DIFFICULT HAVE THESE PROBLEMS MADE IT FOR YOU TO DO YOUR WORK, TAKE CARE OF THINGS AT HOME, OR GET ALONG WITH OTHER PEOPLE: SOMEWHAT DIFFICULT
SUM OF ALL RESPONSES TO PHQ QUESTIONS 1-9: 7

## 2024-12-05 ASSESSMENT — PAIN SCALES - GENERAL: PAINLEVEL_OUTOF10: NO PAIN (0)

## 2024-12-09 ENCOUNTER — PATIENT OUTREACH (OUTPATIENT)
Dept: CARE COORDINATION | Facility: CLINIC | Age: 34
End: 2024-12-09
Payer: COMMERCIAL

## 2024-12-10 DIAGNOSIS — F33.1 MODERATE EPISODE OF RECURRENT MAJOR DEPRESSIVE DISORDER (H): ICD-10-CM

## 2024-12-10 DIAGNOSIS — F41.9 ANXIETY: ICD-10-CM

## 2024-12-10 RX ORDER — VENLAFAXINE HYDROCHLORIDE 150 MG/1
150 CAPSULE, EXTENDED RELEASE ORAL DAILY
Qty: 30 CAPSULE | Refills: 5 | Status: SHIPPED | OUTPATIENT
Start: 2024-12-10 | End: 2025-01-08 | Stop reason: DRUGHIGH

## 2024-12-12 ENCOUNTER — OFFICE VISIT (OUTPATIENT)
Dept: ORTHOPEDICS | Facility: CLINIC | Age: 34
End: 2024-12-12
Payer: COMMERCIAL

## 2024-12-12 VITALS
WEIGHT: 293 LBS | HEIGHT: 63 IN | SYSTOLIC BLOOD PRESSURE: 145 MMHG | BODY MASS INDEX: 51.91 KG/M2 | DIASTOLIC BLOOD PRESSURE: 85 MMHG

## 2024-12-12 DIAGNOSIS — M77.8 FLEXOR TENDINITIS OF HAND: ICD-10-CM

## 2024-12-12 DIAGNOSIS — G56.01 CARPAL TUNNEL SYNDROME OF RIGHT WRIST: Primary | ICD-10-CM

## 2024-12-12 DIAGNOSIS — M79.641 PAIN OF RIGHT HAND: ICD-10-CM

## 2024-12-12 RX ORDER — DICLOFENAC SODIUM 75 MG/1
75 TABLET, DELAYED RELEASE ORAL 2 TIMES DAILY PRN
Qty: 60 TABLET | Refills: 0 | Status: SHIPPED | OUTPATIENT
Start: 2024-12-12

## 2024-12-12 NOTE — LETTER
12/12/2024      Iris Toscano  [address] [id number]      Dear Colleague,    Thank you for referring your patient, Iris Toscano, to the [facility] [place]. Please see a copy of my visit note below.    ASSESSMENT & PLAN  Patient Instructions     1. Carpal tunnel syndrome of right wrist    2. Pain of right hand    3. Flexor tendinitis of hand      -Patient has acute right hand and wrist pain due to tendinitis and carpal tunnel syndrome  -Patient will start diclofenac 75 mg twice a day as needed for pain.  Patient reports no relief with ibuprofen  -Patient has been wearing a loosefitting over-the-counter wrist brace.  Patient was given a cock up wrist brace today to wear mainly at nighttime but also during the day as needed  -Patient will start formal hand therapy and home exercise program  -Patient will continue follow-up with me for further treatment recommendation  -Call direct clinic number [[phone]] at any time with questions or concerns.    Albert Yeo MD [facility]  [facility]        -----    SUBJECTIVE  Iris Toscano is a/an 34 year old Right handed female who is seen in consultation at the request of  Marbella Thurman C.N.P. for evaluation of right hand pain. The patient is seen by themselves.    Onset: 2 week(s) ago. Reports insidious onset without acute precipitating event.  Location of Pain: diffuse pain over palm and thumb   Rating of Pain at worst: 8/10  Rating of Pain Currently: 3/10  Worsened by: Use of the hand and making a fist  Better with: Heat  Treatments tried: rest/activity avoidance, bracing, heat, and Ibuprofen  Associated symptoms: numbness and tingling in first four fingers   Orthopedic history: NO  Relevant surgical history: NO  Social history: Not currently working     Past Medical History:   Diagnosis Date     Anxiety      Asperger's syndrome      Depressive disorder       Social History     Socioeconomic History     Marital status: Single   Tobacco Use     Smoking status: Never     Smokeless tobacco: Never   Vaping Use     Vaping status: Never Used   Substance and Sexual Activity     Alcohol use: Never     Drug use: Never     Sexual activity: Never     Birth control/protection: None   Social History Narrative    FAMILY INFORMATION     Date: 2011    Parent #1      Name: Damion Toscano   Gender: male   : 1957      Occupation:         Parent #2      Name: Elzbieta Toscano   Gender: female   : 1951     Occupation: Homemaker        Siblings:  Name: Elisabet Toscano    : 1994        Relationship Status of Parent(s):     Who does the child live with? mother, father and sister(s)    What language(s) is/are spoken at home? English    Child's Country of Birth? [place]                  Social Drivers of Health     Financial Resource Strain: Low Risk  (2023)    Financial Resource Strain      Within the past 12 months, have you or your family members you live with been unable to get utilities (heat, electricity) when it was really needed?: No   Food Insecurity: Low Risk  (2023)    Food Insecurity      Within the past 12 months, did you worry that your food would run out before you got money to buy more?: No      Within the past 12 months, did the food you bought just not last and you didn t have money to get more?: No   Transportation Needs: Low Risk  (2023)    Transportation Needs      Within the past 12 months, has lack of transportation kept you from medical appointments, getting your medicines, non-medical meetings or appointments, work, or from getting things that you need?: No   Interpersonal Safety: Low Risk  (2024)    Interpersonal Safety      Do you feel physically and emotionally safe where you currently live?: Yes      Within the past 12 months, have you been hit, slapped, kicked or otherwise  "physically hurt by someone?: No      Within the past 12 months, have you been humiliated or emotionally abused in other ways by your partner or ex-partner?: No   Housing Stability: Low Risk  (12/5/2023)    Housing Stability      Do you have housing? : Yes      Are you worried about losing your housing?: No         Patient's past medical, surgical, social, and family histories were reviewed today and no changes are noted.    REVIEW OF SYSTEMS:  10 point ROS is negative other than symptoms noted above in HPI, Past Medical History or as stated below  Constitutional: NEGATIVE for fever, chills, change in weight  Skin: NEGATIVE for worrisome rashes, moles or lesions  GI/: NEGATIVE for bowel or bladder changes  Neuro: NEGATIVE for weakness, dizziness or paresthesias    OBJECTIVE:  BP (!) 145/85   Ht 1.6 m (5' 2.99\")   Wt (!) 164.7 kg (363 lb)   LMP 11/12/2024 (Exact Date)   BMI 64.32 kg/m     General: healthy, alert and in no distress  HEENT: no scleral icterus or conjunctival erythema  Skin: no suspicious lesions or rash. No jaundice.  CV: regular rhythm by palpation  Resp: normal respiratory effort without conversational dyspnea   Psych: normal mood and affect  Gait: normal steady gait with appropriate coordination and balance  Neuro: normal light touch sensory exam of the bilateral hands.    MSK:  RIGHT HAND  Inspection:    No swelling or obvious deformity or asymmetry  Palpation:   Carpals: normal   Metacarpals: 4th metacarpal   Thumb: normal   Fingers: normal  Range of Motion:    Full active flexion and extension at MCP, PIP, and DIP joints; normal finger cascade without malrotation.  Wrist pronation, supination, and ulnar/radial deviation normal.  Strength:     within normal limits, opposition within normal limits  Special Tests:    Positive: Tinel's, Phalen's    Negative: thenar eminence wasting, hypothenar eminence wasting, Finkelstein's, flexor digitorum superficialis testing, flexor digitorum profundus " testing    Independent visualization of the below image:  Results for orders placed or performed in visit on 12/05/24   XR Hand Right G/E 3 Views    Narrative    XR HAND RIGHT G/E 3 VIEWS 12/5/2024 1:17 PM     HISTORY: Pain of right hand    COMPARISON: None.       Impression    IMPRESSION:  No fracture. Joint space are maintained. No focal osseous erosions.    ROBERT HYLTON MD         SYSTEM ID:  [id number]           Albert Yeo MD [facility]      Again, thank you for allowing me to participate in the care of your patient.        Sincerely,        Albert Yeo, MD

## 2024-12-12 NOTE — PROGRESS NOTES
ASSESSMENT & PLAN  Patient Instructions     1. Carpal tunnel syndrome of right wrist    2. Pain of right hand    3. Flexor tendinitis of hand      -Patient has acute right hand and wrist pain due to tendinitis and carpal tunnel syndrome  -Patient will start diclofenac 75 mg twice a day as needed for pain.  Patient reports no relief with ibuprofen  -Patient has been wearing a loosefitting over-the-counter wrist brace.  Patient was given a cock up wrist brace today to wear mainly at nighttime but also during the day as needed  -Patient will start formal hand therapy and home exercise program  -Patient will continue follow-up with me for further treatment recommendation  -Call direct clinic number [[phone]] at any time with questions or concerns.    Albert Yeo MD [facility]  [facility]        -----    SUBJECTIVE  Iris Toscano is a/an 34 year old Right handed female who is seen in consultation at the request of  Marbella Thurman C.N.P. for evaluation of right hand pain. The patient is seen by themselves.    Onset: 2 week(s) ago. Reports insidious onset without acute precipitating event.  Location of Pain: diffuse pain over palm and thumb   Rating of Pain at worst: 8/10  Rating of Pain Currently: 3/10  Worsened by: Use of the hand and making a fist  Better with: Heat  Treatments tried: rest/activity avoidance, bracing, heat, and Ibuprofen  Associated symptoms: numbness and tingling in first four fingers   Orthopedic history: NO  Relevant surgical history: NO  Social history: Not currently working     Past Medical History:   Diagnosis Date    Anxiety     Asperger's syndrome     Depressive disorder      Social History     Socioeconomic History    Marital status: Single   Tobacco Use    Smoking status: Never    Smokeless tobacco: Never   Vaping Use    Vaping status: Never Used   Substance and Sexual Activity    Alcohol use: Never    Drug use: Never    Sexual  activity: Never     Birth control/protection: None   Social History Narrative    FAMILY INFORMATION     Date: 2011    Parent #1      Name: Damion Toscano   Gender: male   : 1955      Occupation:         Parent #2      Name: Elzbieta Toscano   Gender: female   : 1949     Occupation: Homemaker        Siblings:  Name: Elisabet Toscano    : 1994        Relationship Status of Parent(s):     Who does the child live with? mother, father and sister(s)    What language(s) is/are spoken at home? English    Child's Country of Birth? [place]                  Social Drivers of Health     Financial Resource Strain: Low Risk  (2023)    Financial Resource Strain     Within the past 12 months, have you or your family members you live with been unable to get utilities (heat, electricity) when it was really needed?: No   Food Insecurity: Low Risk  (2023)    Food Insecurity     Within the past 12 months, did you worry that your food would run out before you got money to buy more?: No     Within the past 12 months, did the food you bought just not last and you didn t have money to get more?: No   Transportation Needs: Low Risk  (2023)    Transportation Needs     Within the past 12 months, has lack of transportation kept you from medical appointments, getting your medicines, non-medical meetings or appointments, work, or from getting things that you need?: No   Interpersonal Safety: Low Risk  (2024)    Interpersonal Safety     Do you feel physically and emotionally safe where you currently live?: Yes     Within the past 12 months, have you been hit, slapped, kicked or otherwise physically hurt by someone?: No     Within the past 12 months, have you been humiliated or emotionally abused in other ways by your partner or ex-partner?: No   Housing Stability: Low Risk  (2023)    Housing Stability     Do you have housing? : Yes     Are you worried about  "losing your housing?: No         Patient's past medical, surgical, social, and family histories were reviewed today and no changes are noted.    REVIEW OF SYSTEMS:  10 point ROS is negative other than symptoms noted above in HPI, Past Medical History or as stated below  Constitutional: NEGATIVE for fever, chills, change in weight  Skin: NEGATIVE for worrisome rashes, moles or lesions  GI/: NEGATIVE for bowel or bladder changes  Neuro: NEGATIVE for weakness, dizziness or paresthesias    OBJECTIVE:  BP (!) 145/85   Ht 1.6 m (5' 2.99\")   Wt (!) 164.7 kg (363 lb)   LMP 11/12/2024 (Exact Date)   BMI 64.32 kg/m     General: healthy, alert and in no distress  HEENT: no scleral icterus or conjunctival erythema  Skin: no suspicious lesions or rash. No jaundice.  CV: regular rhythm by palpation  Resp: normal respiratory effort without conversational dyspnea   Psych: normal mood and affect  Gait: normal steady gait with appropriate coordination and balance  Neuro: normal light touch sensory exam of the bilateral hands.    MSK:  RIGHT HAND  Inspection:    No swelling or obvious deformity or asymmetry  Palpation:   Carpals: normal   Metacarpals: 4th metacarpal   Thumb: normal   Fingers: normal  Range of Motion:    Full active flexion and extension at MCP, PIP, and DIP joints; normal finger cascade without malrotation.  Wrist pronation, supination, and ulnar/radial deviation normal.  Strength:     within normal limits, opposition within normal limits  Special Tests:    Positive: Tinel's, Phalen's    Negative: thenar eminence wasting, hypothenar eminence wasting, Finkelstein's, flexor digitorum superficialis testing, flexor digitorum profundus testing    Independent visualization of the below image:  Results for orders placed or performed in visit on 12/05/24   XR Hand Right G/E 3 Views    Narrative    XR HAND RIGHT G/E 3 VIEWS 12/5/2024 1:17 PM     HISTORY: Pain of right hand    COMPARISON: None.       Impression    " IMPRESSION:  No fracture. Joint space are maintained. No focal osseous erosions.    ROBERT HYLTON MD         SYSTEM ID:  [id number]           Albert Yeo MD [facility]

## 2024-12-12 NOTE — PATIENT INSTRUCTIONS
1. Carpal tunnel syndrome of right wrist    2. Pain of right hand    3. Flexor tendinitis of hand      -Patient has acute right hand and wrist pain due to tendinitis and carpal tunnel syndrome  -Patient will start diclofenac 75 mg twice a day as needed for pain.  Patient reports no relief with ibuprofen  -Patient has been wearing a loosefitting over-the-counter wrist brace.  Patient was given a cock up wrist brace today to wear mainly at nighttime but also during the day as needed  -Patient will start formal hand therapy and home exercise program  -Patient will continue follow-up with me for further treatment recommendation  -Call direct clinic number [[phone]] at any time with questions or concerns.    Albert Yeo MD [facility]  [facility] [place]

## 2024-12-17 ENCOUNTER — THERAPY VISIT (OUTPATIENT)
Dept: OCCUPATIONAL THERAPY | Facility: CLINIC | Age: 34
End: 2024-12-17
Attending: FAMILY MEDICINE
Payer: COMMERCIAL

## 2024-12-17 DIAGNOSIS — G56.01 CARPAL TUNNEL SYNDROME OF RIGHT WRIST: ICD-10-CM

## 2024-12-17 DIAGNOSIS — M79.641 PAIN OF RIGHT HAND: ICD-10-CM

## 2024-12-17 DIAGNOSIS — M77.8 FLEXOR TENDINITIS OF HAND: ICD-10-CM

## 2024-12-17 PROCEDURE — 97110 THERAPEUTIC EXERCISES: CPT | Mod: GO

## 2024-12-17 PROCEDURE — 97165 OT EVAL LOW COMPLEX 30 MIN: CPT | Mod: GO

## 2024-12-17 PROCEDURE — 97535 SELF CARE MNGMENT TRAINING: CPT | Mod: GO

## 2024-12-17 NOTE — PROGRESS NOTES
OCCUPATIONAL THERAPY EVALUATION  Type of Visit: Evaluation              Subjective        Presenting condition or subjective complaint: (Patient-Rptd) carpal tunnel (probably) in right hand  Date of onset: 12/12/24 (MD order date)    Relevant medical history: (Patient-Rptd) Depression; High blood pressure; Mental Illness; Overweight   Dates & types of surgery: (Patient-Rptd) i had a root canal a couple months ago, otherwise nothing in the last 10 years    Prior diagnostic imaging/testing results: (Patient-Rptd) X-ray     Prior therapy history for the same diagnosis, illness or injury: (Patient-Rptd) No      Prior Level of Function  Transfers: Independent  Ambulation: Independent  ADL: Independent  IADL:  assist available as needed    Living Environment  Social support: (Patient-Rptd) With family members   Type of home: (Patient-Rptd) House; 2-story; Basement   Stairs to enter the home: (Patient-Rptd) No       Ramp: (Patient-Rptd) Yes   Stairs inside the home: (Patient-Rptd) Yes (Patient-Rptd) 13 Is there a railing: (Patient-Rptd) Yes     Help at home: (Patient-Rptd) Self Cares (home health aide/personal care attendant, family, etc); Home management tasks (cooking, cleaning); Medication and/or finances; Home and Yard maintenance tasks  Equipment owned: (Patient-Rptd) Crutches     Employment: (Patient-Rptd) No    Hobbies/Interests: (Patient-Rptd) videogames, music, PC games - mouse use     Patient goals for therapy: (Patient-Rptd) use my hand normally without pain or numbness    Pain assessment: Pain present  Consistent numbness in R hand      Objective     Upper Extremity Functional Index Score:  SCORE:   Column Totals: /80: (Patient-Rptd) 61   (A lower score indicates greater disability.)    Right hand dominant  Patient reports symptoms of pain, weakness/loss of strength, numbness, and tingling     Pain Level (Scale 0-10):   12/17/2024   At Rest 0   With Use 1-2     Pain Description:  Date 12/17/2024   Location  wrist and thumb, palm   Pain Quality Aching and Dull   Frequency intermittent     Pain is worst  daytime   Exacerbated by  Repetitive use    Relieved by bracing   Progression progressing     Posture  Forward Neck Posture and Rounded Forward Shoulders    Edema (Circumference measured in cm)   12/17/2024 12/17/2024    L R   DWC 19 19.5       Sensation  Decreased Median Nerve distribution per pt report    ROM  Pain Report: - none  + mild    ++ moderate    +++ severe   Wrist 12/17/2024 12/17/2024   AROM (PROM) L R   Extension  -   Flexion  -   RD  -   UD  -     Special Tests  Pain Report:  - none    + mild    ++ moderate    +++ severe    12/17/2024   Median Nerve Compression at Pronator -       López Test for Lumbrical Incursion  (fist x 30 secs) + 10 s.    Tinels at Carpal Tunnel -   Phalens + 20 s.      Neural Tension Testing  MNT: Median Neurodynamic Test (based on DS Maryan's ULNT)   12/17/2024   0-5 Scale 4+/5 S1   Position:   0/5: Arm across abdomen in coronal plane  1/5: Depress shoulder, ER to neutral ABD shoulder to 45 degrees  2/5: ER shoulder to end range, keep elbow at 90 degrees  3/5: Extend elbow to 0 degrees  4/5: Fully supinate forearm  5/5: Extend wrist, fingers and thumb  Notes:  (+) indicates beyond grade level but less than prison to next level  (-) indicates over prison to level  S1  onset/change of patient's symptoms  S2 definite stop point based on patient's discomfort level    Strength   (Measured in pounds)  Pain Report: - none  + mild    ++ moderate    +++ severe    12/17/2024 12/17/2024   Trials L R   1  2  3 84 64+   Average       Lat Pinch 12/17/2024 12/17/2024   Trials L R   1  2  3 19 20   Average       3 Pt Pinch 12/17/2024 12/17/2024   Trials L R   1  2  3 18 16   Average       Palpation R UE   Pain Report:  - none    + mild    ++ moderate    +++ severe    12/17/2024   Thenar eminence  +   Web space +   FA flexors  -   Pronator teres  -                 Assessment & Plan   CLINICAL  IMPRESSIONS  Medical Diagnosis: R hand pain, CTS, flexor tendinitis    Treatment Diagnosis: R CTS, R hand pain    Impression/Assessment: Pt is a 34 year old female presenting to Occupational Therapy due to R hand pain, CTS.  The following significant findings have been identified: Impaired activity tolerance, Impaired sensation, Impaired strength, and Pain.  These identified deficits interfere with their ability to perform self care tasks, recreational activities, household chores, driving , care of others, and meal planning and preparation as compared to previous level of function.   Patient's limitations or Problem List includes: Pain, Increased edema, Weakness, Sensory disturbance, and Tightness in musculature of the right wrist and hand which interferes with the patient's ability to perform Self Care Tasks (bathing), Recreational Activities, Household Chores, and Driving  as compared to previous level of function.    Clinical Decision Making (Complexity):  Assessment of Occupational Performance: 3-5 Performance Deficits  Occupational Performance Limitations: bathing/showering, care of others, meal preparation and cleanup, and leisure activities  Clinical Decision Making (Complexity): Low complexity    PLAN OF CARE  Treatment Interventions:  Modalities:  US, Paraffin, and TENS  Therapeutic Exercise:  AROM, PROM, Tendon Gliding, Isotonics, and Isometrics  Neuromuscular re-education:  Nerve Gliding, Proprioceptive Training, Posture, and Kinesiotaping  Manual Techniques:  Joint mobilization, Friction massage, and Myofascial release  Orthotic Fabrication:  Static  Self Care:  Self Care Tasks and Ergonomic Considerations    Long Term Goals   OT Goal 1  Goal Description: Pt will wake w/out R hand pain or paresthesia on 6/7 or greater nights per week, for improved QOL and ADL function  Rationale: In order to maximize safety and independence with ADL/IADLs  Target Date: 02/11/25      Frequency of Treatment:  1x/week  Duration of Treatment: 8 weeks     Recommended Referrals to Other Professionals:  None  Education Assessment: Learner/Method: Patient  Education Comments: Pt demos good understanding of POC     Risks and benefits of evaluation/treatment have been explained.   Patient/Family/caregiver agrees with Plan of Care.     Evaluation Time:    OT Ivelisse Low Complexity Minutes (04455): 15       Signing Clinician: ALEA Ying [facility]                                                                                   OUTPATIENT OCCUPATIONAL THERAPY      PLAN OF TREATMENT FOR OUTPATIENT REHABILITATION   Patient's Last Name, First Name, RADHAIris Boyd YOB: 1990   Provider's Name   [facility]   Medical Record No.  [id number]     Onset Date: 12/12/24 (MD order date) Start of Care Date: 12/17/24     Medical Diagnosis:  R hand pain, CTS, flexor tendinitis      OT Treatment Diagnosis:  R CTS, R hand pain Plan of Treatment  Frequency/Duration:1x/week/8 weeks    Certification date from 12/17/24   To 03/11/25        See note for plan of treatment details and functional goals     ALEA Ying                         I CERTIFY THE NEED FOR THESE SERVICES FURNISHED UNDER        THIS PLAN OF TREATMENT AND WHILE UNDER MY CARE     (Physician attestation of this document indicates review and certification of the therapy plan).              Referring Provider:  Albert Yeo    Initial Assessment  See Epic Evaluation- 12/17/24

## 2024-12-19 ENCOUNTER — LAB (OUTPATIENT)
Dept: LAB | Facility: CLINIC | Age: 34
End: 2024-12-19
Payer: COMMERCIAL

## 2024-12-19 ENCOUNTER — OFFICE VISIT (OUTPATIENT)
Dept: ALLERGY | Facility: CLINIC | Age: 34
End: 2024-12-19
Payer: COMMERCIAL

## 2024-12-19 VITALS
SYSTOLIC BLOOD PRESSURE: 140 MMHG | WEIGHT: 293 LBS | OXYGEN SATURATION: 98 % | HEART RATE: 112 BPM | BODY MASS INDEX: 64.22 KG/M2 | DIASTOLIC BLOOD PRESSURE: 70 MMHG

## 2024-12-19 DIAGNOSIS — L50.9 HIVES: ICD-10-CM

## 2024-12-19 LAB
ALBUMIN SERPL BCG-MCNC: 4.2 G/DL (ref 3.5–5.2)
ALP SERPL-CCNC: 87 U/L (ref 40–150)
ALT SERPL W P-5'-P-CCNC: 24 U/L (ref 0–50)
ANION GAP SERPL CALCULATED.3IONS-SCNC: 12 MMOL/L (ref 7–15)
AST SERPL W P-5'-P-CCNC: 25 U/L (ref 0–45)
BASOPHILS # BLD AUTO: 0 10E3/UL (ref 0–0.2)
BASOPHILS NFR BLD AUTO: 0 %
BILIRUB SERPL-MCNC: 0.4 MG/DL
BUN SERPL-MCNC: 13.6 MG/DL (ref 6–20)
CALCIUM SERPL-MCNC: 9.1 MG/DL (ref 8.8–10.4)
CHLORIDE SERPL-SCNC: 102 MMOL/L (ref 98–107)
CREAT SERPL-MCNC: 0.86 MG/DL (ref 0.51–0.95)
EGFRCR SERPLBLD CKD-EPI 2021: >90 ML/MIN/1.73M2
EOSINOPHIL # BLD AUTO: 0 10E3/UL (ref 0–0.7)
EOSINOPHIL NFR BLD AUTO: 0 %
ERYTHROCYTE [DISTWIDTH] IN BLOOD BY AUTOMATED COUNT: 17.7 % (ref 10–15)
GLUCOSE SERPL-MCNC: 105 MG/DL (ref 70–99)
HCO3 SERPL-SCNC: 24 MMOL/L (ref 22–29)
HCT VFR BLD AUTO: 38.9 % (ref 35–47)
HGB BLD-MCNC: 11.5 G/DL (ref 11.7–15.7)
IMM GRANULOCYTES # BLD: 0 10E3/UL
IMM GRANULOCYTES NFR BLD: 0 %
LYMPHOCYTES # BLD AUTO: 2.2 10E3/UL (ref 0.8–5.3)
LYMPHOCYTES NFR BLD AUTO: 24 %
MCH RBC QN AUTO: 21.4 PG (ref 26.5–33)
MCHC RBC AUTO-ENTMCNC: 29.6 G/DL (ref 31.5–36.5)
MCV RBC AUTO: 72 FL (ref 78–100)
MONOCYTES # BLD AUTO: 0.5 10E3/UL (ref 0–1.3)
MONOCYTES NFR BLD AUTO: 5 %
NEUTROPHILS # BLD AUTO: 6.7 10E3/UL (ref 1.6–8.3)
NEUTROPHILS NFR BLD AUTO: 71 %
PLATELET # BLD AUTO: 396 10E3/UL (ref 150–450)
POTASSIUM SERPL-SCNC: 4.3 MMOL/L (ref 3.4–5.3)
PROT SERPL-MCNC: 7.6 G/DL (ref 6.4–8.3)
RBC # BLD AUTO: 5.38 10E6/UL (ref 3.8–5.2)
SODIUM SERPL-SCNC: 138 MMOL/L (ref 135–145)
TSH SERPL DL<=0.005 MIU/L-ACNC: 2.6 UIU/ML (ref 0.3–4.2)
WBC # BLD AUTO: 9.4 10E3/UL (ref 4–11)

## 2024-12-19 RX ORDER — FEXOFENADINE HCL 180 MG/1
180 TABLET ORAL DAILY
COMMUNITY

## 2024-12-19 ASSESSMENT — PATIENT HEALTH QUESTIONNAIRE - PHQ9
SUM OF ALL RESPONSES TO PHQ QUESTIONS 1-9: 10
SUM OF ALL RESPONSES TO PHQ QUESTIONS 1-9: 10
10. IF YOU CHECKED OFF ANY PROBLEMS, HOW DIFFICULT HAVE THESE PROBLEMS MADE IT FOR YOU TO DO YOUR WORK, TAKE CARE OF THINGS AT HOME, OR GET ALONG WITH OTHER PEOPLE: VERY DIFFICULT

## 2024-12-19 NOTE — PROGRESS NOTES
Iris Toscano was seen in the Allergy Clinic at [facility].    Iris Toscano is a 34 year old female being seen today at the request of Marbella Thurman APRN CNP [facility] in consultation for Hives    She has had a problem with her hives for the last 4 months.  She was started on Effexor back in April.  She has noticed vivid dreams as of August.    She has been taking Allegra most days which works well.  She also was treated with prednisone on 1 occasion when she had more significant hives.  Prednisone was quite effective for treatment of hives.  Blood test for celiac disease and milk IgE were normal.  The dose of Allegra is 180 mg.  When around cats she has had problems with hives and an exposure to cat is when the hives started.  They were quite severe at that time.  She has not had cat exposure since.    She has a couple different types of hives from what she describes.  One type of hive is skin colored and raised with mild itching and another type is more red and raised and itches significantly.  Typically the lesions will last 1 up to 24 hours.  It is rare that they last that long.  Hives have occurred in multiple locations.  She tried fasting at 1 point and still developed hives, so no food allergy concerns.  When she sweats or gets hot she will have problems with hives.  There is a time that she put a ice pack on her face and had a hive in the shape of the ice pack.    Depression Screening Follow-up        12/19/2024    11:50 AM   PHQ   PHQ-9 Total Score 10    Q9: Thoughts of better off dead/self-harm past 2 weeks Not at all       Patient-reported         12/19/2024    11:50 AM   Last PHQ-9   1.  Little interest or pleasure in doing things 2   2.  Feeling down, depressed, or hopeless 1   3.  Trouble falling or staying asleep, or sleeping too much 2   4.  Feeling tired or having little energy 1   5.  Poor appetite or overeating 2   6.  Feeling bad about  yourself 1   7.  Trouble concentrating 1   8.  Moving slowly or restless 0   Q9: Thoughts of better off dead/self-harm past 2 weeks 0   PHQ-9 Total Score 10        Patient-reported         Does the patient currently have a mental health provider?  No  Follow Up Actions Taken  Patient to follow up with PCP. Clinic staff to schedule appointment if able.  Patient declined referral.     Brian Mckeon MD       Past Medical History:   Diagnosis Date    Anxiety     Asperger's syndrome     Depressive disorder      Family History   Problem Relation Age of Onset    Breast Cancer Mother     Cancer Mother         leukemia    Lipids Mother     Thyroid Disease Mother     Lipids Father     Lipids Sister     Hypertension Maternal Grandmother     Cerebrovascular Disease Maternal Grandmother     Breast Cancer Maternal Grandmother     C.A.D. Maternal Grandmother     Diabetes Maternal Grandfather     Cancer Maternal Aunt         breast    Depression Maternal Aunt     Lipids Other         grandparents     Past Surgical History:   Procedure Laterality Date    ADENOIDECTOMY      As a child    INGUINAL HERNIA REPAIR      ?side, at age 8    IR LUMBAR PUNCTURE  2/22/2013    MYRINGOTOMY BILATERAL      As a child    WISDOM TOOTH EXTRACTION  2013       ENVIRONMENTAL HISTORY:   Pets inside the house include 0 cat(s).  Do you smoke cigarettes or other recreational drugs? No There is/are 0 smokers living in the house. The house does not have a damp basement.     SOCIAL HISTORY:   Iris is unemployed. She lives with her mother and father.      Review of Systems      Current Outpatient Medications:     fexofenadine (ALLEGRA) 180 MG tablet, Take 180 mg by mouth daily., Disp: , Rfl:     medroxyPROGESTERone (PROVERA) 5 MG tablet, Take 1 pill on Day #1-12 each month., Disp: 36 tablet, Rfl: 4    traZODone (DESYREL) 50 MG tablet, Take 0.5-2 tablets ( mg) by mouth nightly as needed for sleep, Disp: 30 tablet, Rfl: 3    venlafaxine (EFFEXOR XR) 150 MG  24 hr capsule, Take 1 capsule (150 mg) by mouth daily., Disp: 30 capsule, Rfl: 5    diclofenac (VOLTAREN) 75 MG EC tablet, Take 1 tablet (75 mg) by mouth 2 times daily as needed for moderate pain. (Patient not taking: Reported on 12/19/2024), Disp: 60 tablet, Rfl: 0    venlafaxine (EFFEXOR XR) 37.5 MG 24 hr capsule, TAKE ONE CAPSULE BY MOUTH EVERY DAY FOR 1 WEEK THEN 2 CAPSULES DAILY FOR 1 WEEK THEN 3 CAPSULES BY MOUTH DAILY FOR 1 WEEK THEN 4 CAPSULES DAILY (Patient not taking: Reported on 12/19/2024), Disp: , Rfl:     venlafaxine (EFFEXOR XR) 75 MG 24 hr capsule, , Disp: , Rfl:   No Known Allergies      EXAM:   BP (!) 140/70   Pulse 112   Wt (!) 164.4 kg (362 lb 6.4 oz)   LMP 11/12/2024 (Exact Date)   SpO2 98%   BMI 64.22 kg/m      Physical Exam    Constitutional:       General: She is not in acute distress.     Appearance: Normal appearance. She is not ill-appearing.   HENT:      Head: Normocephalic and atraumatic.      Nose: Nose normal. No congestion or rhinorrhea.      Mouth/Throat:      Mouth: Mucous membranes are moist.      Pharynx: Oropharynx is clear. No posterior oropharyngeal erythema.   Eyes:      General:         Right eye: No discharge.         Left eye: No discharge.   Cardiovascular:      Rate and Rhythm: Normal rate and regular rhythm.      Heart sounds: Normal heart sounds.   Pulmonary:      Effort: Pulmonary effort is normal.      Breath sounds: Normal breath sounds. No wheezing or rhonchi.   Skin:     General: Skin is warm.      Findings: She has mild erythema her left forearm and also left thigh without significant raise component  Neurological:      General: No focal deficit present.      Mental Status: She is alert. Mental status is at baseline.   Psychiatric:         Mood and Affect: Mood normal.         Behavior: Behavior normal.      ASSESSMENT/PLAN:  Iris Toscano is a 34 year old female seen today for evaluation of hives.  This is chronic in nature.  Responding to antihistamines.   Will make a change in her antihistamine dosing.  She is only taking this every other day or every 3 days at this time.  Will also check labs to make sure there is not an underlying reason for the hives.  She is going to talk to her provider in regards to potentially changing the dose of the Effexor.  I do not think this is the cause.    Will check labs  Allegra 180 mg once daily, then change to Zyrtec (cetirizine) 10 mg at night. Try [employer] or [employer].  After 2 months and if no hives, reduce dose to every other day for 1 week, then stop.  Will contact you with results    Follow-up in 1 to 2 months      Thank you for allowing me to participate in the care of Iris Toscano.      I spent 35 minutes on the date of the encounter doing chart review, history and exam, documentation and further coordination as noted above exclusive of separately reported interpretations    Brian Mckeon MD  Allergy/Immunology  [facility]    Answers submitted by the patient for this visit:  Patient Health Questionnaire (Submitted on 12/19/2024)  If you checked off any problems, how difficult have these problems made it for you to do your work, take care of things at home, or get along with other people?: Very difficult  PHQ9 TOTAL SCORE: 10

## 2024-12-19 NOTE — PATIENT INSTRUCTIONS
Will check labs  Allegra 180 mg once daily, then change to Zyrtec (cetirizine) 10 mg at night. Try [employer] or [employer].  After 2 months and if no hives, reduce dose to every other day for 1 week, then stop.  Will contact you with results

## 2024-12-19 NOTE — LETTER
12/19/2024      Iris Toscano  [address] [id number]      Dear Colleague,    Thank you for referring your patient, Iris Toscano, to the [facility]. Please see a copy of my visit note below.    Iris Toscano was seen in the Allergy Clinic at [facility].    Iris Toscano is a 34 year old female being seen today at the request of Marbella Thurman APRN CNP [facility] in consultation for Hives    She has had a problem with her hives for the last 4 months.  She was started on Effexor back in April.  She has noticed vivid dreams as of August.    She has been taking Allegra most days which works well.  She also was treated with prednisone on 1 occasion when she had more significant hives.  Prednisone was quite effective for treatment of hives.  Blood test for celiac disease and milk IgE were normal.  The dose of Allegra is 180 mg.  When around cats she has had problems with hives and an exposure to cat is when the hives started.  They were quite severe at that time.  She has not had cat exposure since.    She has a couple different types of hives from what she describes.  One type of hive is skin colored and raised with mild itching and another type is more red and raised and itches significantly.  Typically the lesions will last 1 up to 24 hours.  It is rare that they last that long.  Hives have occurred in multiple locations.  She tried fasting at 1 point and still developed hives, so no food allergy concerns.  When she sweats or gets hot she will have problems with hives.  There is a time that she put a ice pack on her face and had a hive in the shape of the ice pack.    Depression Screening Follow-up        12/19/2024    11:50 AM   PHQ   PHQ-9 Total Score 10    Q9: Thoughts of better off dead/self-harm past 2 weeks Not at all       Patient-reported         12/19/2024    11:50 AM   Last PHQ-9   1.  Little interest or  pleasure in doing things 2   2.  Feeling down, depressed, or hopeless 1   3.  Trouble falling or staying asleep, or sleeping too much 2   4.  Feeling tired or having little energy 1   5.  Poor appetite or overeating 2   6.  Feeling bad about yourself 1   7.  Trouble concentrating 1   8.  Moving slowly or restless 0   Q9: Thoughts of better off dead/self-harm past 2 weeks 0   PHQ-9 Total Score 10        Patient-reported         Does the patient currently have a mental health provider?  No  Follow Up Actions Taken  Patient to follow up with PCP. Clinic staff to schedule appointment if able.  Patient declined referral.     Brian Mckeon MD       Past Medical History:   Diagnosis Date     Anxiety      Asperger's syndrome      Depressive disorder      Family History   Problem Relation Age of Onset     Breast Cancer Mother      Cancer Mother         leukemia     Lipids Mother      Thyroid Disease Mother      Lipids Father      Lipids Sister      Hypertension Maternal Grandmother      Cerebrovascular Disease Maternal Grandmother      Breast Cancer Maternal Grandmother      C.A.D. Maternal Grandmother      Diabetes Maternal Grandfather      Cancer Maternal Aunt         breast     Depression Maternal Aunt      Lipids Other         grandparents     Past Surgical History:   Procedure Laterality Date     ADENOIDECTOMY      As a child     INGUINAL HERNIA REPAIR      ?side, at age 8     IR LUMBAR PUNCTURE  2/22/2013     MYRINGOTOMY BILATERAL      As a child     WISDOM TOOTH EXTRACTION  2013       ENVIRONMENTAL HISTORY:   Pets inside the house include 0 cat(s).  Do you smoke cigarettes or other recreational drugs? No There is/are 0 smokers living in the house. The house does not have a damp basement.     SOCIAL HISTORY:   Iris is unemployed. She lives with her mother and father.      Review of Systems      Current Outpatient Medications:      fexofenadine (ALLEGRA) 180 MG tablet, Take 180 mg by mouth daily., Disp: , Rfl:       medroxyPROGESTERone (PROVERA) 5 MG tablet, Take 1 pill on Day #1-12 each month., Disp: 36 tablet, Rfl: 4     traZODone (DESYREL) 50 MG tablet, Take 0.5-2 tablets ( mg) by mouth nightly as needed for sleep, Disp: 30 tablet, Rfl: 3     venlafaxine (EFFEXOR XR) 150 MG 24 hr capsule, Take 1 capsule (150 mg) by mouth daily., Disp: 30 capsule, Rfl: 5     diclofenac (VOLTAREN) 75 MG EC tablet, Take 1 tablet (75 mg) by mouth 2 times daily as needed for moderate pain. (Patient not taking: Reported on 12/19/2024), Disp: 60 tablet, Rfl: 0     venlafaxine (EFFEXOR XR) 37.5 MG 24 hr capsule, TAKE ONE CAPSULE BY MOUTH EVERY DAY FOR 1 WEEK THEN 2 CAPSULES DAILY FOR 1 WEEK THEN 3 CAPSULES BY MOUTH DAILY FOR 1 WEEK THEN 4 CAPSULES DAILY (Patient not taking: Reported on 12/19/2024), Disp: , Rfl:      venlafaxine (EFFEXOR XR) 75 MG 24 hr capsule, , Disp: , Rfl:   No Known Allergies      EXAM:   BP (!) 140/70   Pulse 112   Wt (!) 164.4 kg (362 lb 6.4 oz)   LMP 11/12/2024 (Exact Date)   SpO2 98%   BMI 64.22 kg/m      Physical Exam    Constitutional:       General: She is not in acute distress.     Appearance: Normal appearance. She is not ill-appearing.   HENT:      Head: Normocephalic and atraumatic.      Nose: Nose normal. No congestion or rhinorrhea.      Mouth/Throat:      Mouth: Mucous membranes are moist.      Pharynx: Oropharynx is clear. No posterior oropharyngeal erythema.   Eyes:      General:         Right eye: No discharge.         Left eye: No discharge.   Cardiovascular:      Rate and Rhythm: Normal rate and regular rhythm.      Heart sounds: Normal heart sounds.   Pulmonary:      Effort: Pulmonary effort is normal.      Breath sounds: Normal breath sounds. No wheezing or rhonchi.   Skin:     General: Skin is warm.      Findings: She has mild erythema her left forearm and also left thigh without significant raise component  Neurological:      General: No focal deficit present.      Mental Status: She is alert.  Mental status is at baseline.   Psychiatric:         Mood and Affect: Mood normal.         Behavior: Behavior normal.      ASSESSMENT/PLAN:  Iris Toscano is a 34 year old female seen today for evaluation of hives.  This is chronic in nature.  Responding to antihistamines.  Will make a change in her antihistamine dosing.  She is only taking this every other day or every 3 days at this time.  Will also check labs to make sure there is not an underlying reason for the hives.  She is going to talk to her provider in regards to potentially changing the dose of the Effexor.  I do not think this is the cause.    Will check labs  Allegra 180 mg once daily, then change to Zyrtec (cetirizine) 10 mg at night. Try [employer] or [employer].  After 2 months and if no hives, reduce dose to every other day for 1 week, then stop.  Will contact you with results    Follow-up in 1 to 2 months      Thank you for allowing me to participate in the care of Iris Toscano.      I spent 35 minutes on the date of the encounter doing chart review, history and exam, documentation and further coordination as noted above exclusive of separately reported interpretations    Brian Mckeon MD  Allergy/Immunology  [facility]    Answers submitted by the patient for this visit:  Patient Health Questionnaire (Submitted on 12/19/2024)  If you checked off any problems, how difficult have these problems made it for you to do your work, take care of things at home, or get along with other people?: Very difficult  PHQ9 TOTAL SCORE: 10      Again, thank you for allowing me to participate in the care of your patient.        Sincerely,        Brian Mckoen MD

## 2024-12-22 NOTE — PROGRESS NOTES
"  Assessment & Plan     Pain of right hand  Patient presents to the clinic for a chief complaint of pain of her right hand.  Unclear of etiology.  Could possibly be carpal tunnel or tendinitis of the wrist.  Patient does have a brace at home that she has worn from time to time.  I do advise that she wear that is specifically at night.  We will get an x-ray of it and have her follow-up with Ortho for further evaluation.  Patient agrees with plan.  - XR Hand Right G/E 3 Views; Future  - Orthopedic  Referral; Future        30 minutes spent by me on the date of the encounter doing chart review, review of test results, interpretation of tests, patient visit, and documentation     BMI  Estimated body mass index is 64.38 kg/m  as calculated from the following:    Height as of 8/15/24: 1.6 m (5' 2.99\").    Weight as of this encounter: 164.8 kg (363 lb 4.8 oz).   Weight management plan: Patient was referred to their PCP to discuss a diet and exercise plan.          Adelina Simmons is a 34 year old, presenting for the following health issues:  Right hand pain started last tuesday. With no known injury. She has had hand issues in the past but she has just fiddled with it and that clears up the pain.   She has tried ibuprofen a couple times and that didn't seem to help.       Hand Pain (Pain in right hand around palm, great, index and middle finger x 1.5 week. During the days its not so bad but after waking up in the morning it gets really bad from sleeping on it.)        12/5/2024    12:43 PM   Additional Questions   Roomed by Haven Byrnes MA   Accompanied by Self         12/5/2024    12:43 PM   Patient Reported Additional Medications   Patient reports taking the following new medications no     Hand Pain    History of Present Illness       Reason for visit:  Hand pain  Symptom onset:  1-2 weeks ago  Symptoms include:  Hurts making fist, pain, numbness  Symptom intensity:  Moderate  Symptom progression:  " Improving  Had these symptoms before:  Yes  Has tried/received treatment for these symptoms:  No  What makes it worse:  Lying on right side, sleeping  What makes it better:  Maybe heat?   She is taking medications regularly.                 Review of Systems  Constitutional, HEENT, cardiovascular, pulmonary, gi and gu systems are negative, except as otherwise noted.      Objective    /66 (BP Location: Right arm, Patient Position: Sitting, Cuff Size: Adult Large)   Pulse 109   Temp 98.5  F (36.9  C) (Oral)   Resp 16   Wt (!) 164.8 kg (363 lb 4.8 oz)   LMP 11/12/2024 (Exact Date)   SpO2 98%   BMI 64.38 kg/m    Body mass index is 64.38 kg/m .  Physical Exam   GENERAL: alert and no distress  ORTHO: Hand/Finger Exam: Inspection:All Normal  Tender: All Normal  Non-tender: All Normal  Range of Motion All Normal  Strength: full strength  Special tests:       Xray - Reviewed and interpreted by me.  Normal. No acute fracture        Signed Electronically by: PARIS Aparicio CNP     Never

## 2024-12-31 ENCOUNTER — THERAPY VISIT (OUTPATIENT)
Dept: OCCUPATIONAL THERAPY | Facility: CLINIC | Age: 34
End: 2024-12-31
Payer: COMMERCIAL

## 2024-12-31 DIAGNOSIS — M77.8 FLEXOR TENDINITIS OF HAND: Primary | ICD-10-CM

## 2024-12-31 DIAGNOSIS — M79.641 PAIN OF RIGHT HAND: ICD-10-CM

## 2024-12-31 DIAGNOSIS — G56.01 CARPAL TUNNEL SYNDROME OF RIGHT WRIST: ICD-10-CM

## 2024-12-31 PROCEDURE — 97110 THERAPEUTIC EXERCISES: CPT | Mod: GO | Performed by: OCCUPATIONAL THERAPIST

## 2024-12-31 PROCEDURE — 97112 NEUROMUSCULAR REEDUCATION: CPT | Mod: GO | Performed by: OCCUPATIONAL THERAPIST

## 2025-01-03 SDOH — HEALTH STABILITY: PHYSICAL HEALTH: ON AVERAGE, HOW MANY DAYS PER WEEK DO YOU ENGAGE IN MODERATE TO STRENUOUS EXERCISE (LIKE A BRISK WALK)?: 0 DAYS

## 2025-01-03 SDOH — HEALTH STABILITY: PHYSICAL HEALTH: ON AVERAGE, HOW MANY MINUTES DO YOU ENGAGE IN EXERCISE AT THIS LEVEL?: 0 MIN

## 2025-01-03 ASSESSMENT — SOCIAL DETERMINANTS OF HEALTH (SDOH): HOW OFTEN DO YOU GET TOGETHER WITH FRIENDS OR RELATIVES?: PATIENT DECLINED

## 2025-01-08 ENCOUNTER — TELEPHONE (OUTPATIENT)
Dept: INTERNAL MEDICINE | Facility: CLINIC | Age: 35
End: 2025-01-08

## 2025-01-08 ENCOUNTER — OFFICE VISIT (OUTPATIENT)
Dept: INTERNAL MEDICINE | Facility: CLINIC | Age: 35
End: 2025-01-08
Payer: COMMERCIAL

## 2025-01-08 VITALS
WEIGHT: 293 LBS | TEMPERATURE: 97.8 F | HEART RATE: 119 BPM | OXYGEN SATURATION: 97 % | HEIGHT: 63 IN | RESPIRATION RATE: 20 BRPM | SYSTOLIC BLOOD PRESSURE: 132 MMHG | BODY MASS INDEX: 51.91 KG/M2 | DIASTOLIC BLOOD PRESSURE: 78 MMHG

## 2025-01-08 DIAGNOSIS — Z00.00 ROUTINE GENERAL MEDICAL EXAMINATION AT A HEALTH CARE FACILITY: Primary | ICD-10-CM

## 2025-01-08 DIAGNOSIS — E66.813 CLASS 3 SEVERE OBESITY DUE TO EXCESS CALORIES WITHOUT SERIOUS COMORBIDITY WITH BODY MASS INDEX (BMI) OF 60.0 TO 69.9 IN ADULT (H): ICD-10-CM

## 2025-01-08 DIAGNOSIS — E66.01 CLASS 3 SEVERE OBESITY DUE TO EXCESS CALORIES WITHOUT SERIOUS COMORBIDITY WITH BODY MASS INDEX (BMI) OF 60.0 TO 69.9 IN ADULT (H): ICD-10-CM

## 2025-01-08 DIAGNOSIS — F33.1 MODERATE EPISODE OF RECURRENT MAJOR DEPRESSIVE DISORDER (H): ICD-10-CM

## 2025-01-08 DIAGNOSIS — N91.2 ANOVULATORY AMENORRHEA: ICD-10-CM

## 2025-01-08 DIAGNOSIS — F41.9 ANXIETY: ICD-10-CM

## 2025-01-08 DIAGNOSIS — D64.9 ANEMIA, UNSPECIFIED TYPE: ICD-10-CM

## 2025-01-08 LAB
ERYTHROCYTE [DISTWIDTH] IN BLOOD BY AUTOMATED COUNT: 17.3 % (ref 10–15)
FERRITIN SERPL-MCNC: 34 NG/ML (ref 6–175)
HCT VFR BLD AUTO: 36.1 % (ref 35–47)
HGB BLD-MCNC: 11.3 G/DL (ref 11.7–15.7)
IRON BINDING CAPACITY (ROCHE): 323 UG/DL (ref 240–430)
IRON SATN MFR SERPL: 8 % (ref 15–46)
IRON SERPL-MCNC: 27 UG/DL (ref 37–145)
MCH RBC QN AUTO: 21.8 PG (ref 26.5–33)
MCHC RBC AUTO-ENTMCNC: 31.3 G/DL (ref 31.5–36.5)
MCV RBC AUTO: 70 FL (ref 78–100)
PLATELET # BLD AUTO: 331 10E3/UL (ref 150–450)
RBC # BLD AUTO: 5.19 10E6/UL (ref 3.8–5.2)
WBC # BLD AUTO: 9.2 10E3/UL (ref 4–11)

## 2025-01-08 PROCEDURE — 99395 PREV VISIT EST AGE 18-39: CPT | Performed by: INTERNAL MEDICINE

## 2025-01-08 PROCEDURE — 83550 IRON BINDING TEST: CPT | Performed by: INTERNAL MEDICINE

## 2025-01-08 PROCEDURE — 85027 COMPLETE CBC AUTOMATED: CPT | Performed by: INTERNAL MEDICINE

## 2025-01-08 PROCEDURE — 82728 ASSAY OF FERRITIN: CPT | Performed by: INTERNAL MEDICINE

## 2025-01-08 PROCEDURE — 83540 ASSAY OF IRON: CPT | Performed by: INTERNAL MEDICINE

## 2025-01-08 PROCEDURE — 36415 COLL VENOUS BLD VENIPUNCTURE: CPT | Performed by: INTERNAL MEDICINE

## 2025-01-08 PROCEDURE — 99213 OFFICE O/P EST LOW 20 MIN: CPT | Mod: 25 | Performed by: INTERNAL MEDICINE

## 2025-01-08 RX ORDER — CETIRIZINE HYDROCHLORIDE 10 MG/1
10 TABLET ORAL DAILY
COMMUNITY

## 2025-01-08 RX ORDER — VENLAFAXINE HYDROCHLORIDE 75 MG/1
75 CAPSULE, EXTENDED RELEASE ORAL DAILY
Qty: 30 CAPSULE | Refills: 11 | Status: SHIPPED | OUTPATIENT
Start: 2025-01-08

## 2025-01-08 ASSESSMENT — PATIENT HEALTH QUESTIONNAIRE - PHQ9: SUM OF ALL RESPONSES TO PHQ QUESTIONS 1-9: 9

## 2025-01-08 NOTE — PATIENT INSTRUCTIONS
Patient Education   Preventive Care Advice   This is general advice given by our system to help you stay healthy. However, your care team may have specific advice just for you. Please talk to your care team about your preventive care needs.  Nutrition  Eat 5 or more servings of fruits and vegetables each day.  Try wheat bread, brown rice and whole grain pasta (instead of white bread, rice, and pasta).  Get enough calcium and vitamin D. Check the label on foods and aim for 100% of the RDA (recommended daily allowance).  Lifestyle  Exercise at least 150 minutes each week  (30 minutes a day, 5 days a week).  Do muscle strengthening activities 2 days a week. These help control your weight and prevent disease.  No smoking.  Wear sunscreen to prevent skin cancer.  Have a dental exam and cleaning every 6 months.  Yearly exams  See your health care team every year to talk about:  Any changes in your health.  Any medicines your care team has prescribed.  Preventive care, family planning, and ways to prevent chronic diseases.  Shots (vaccines)   HPV shots (up to age 26), if you've never had them before.  Hepatitis B shots (up to age 59), if you've never had them before.  COVID-19 shot: Get this shot when it's due.  Flu shot: Get a flu shot every year.  Tetanus shot: Get a tetanus shot every 10 years.  Pneumococcal, hepatitis A, and RSV shots: Ask your care team if you need these based on your risk.  Shingles shot (for age 50 and up)  General health tests  Diabetes screening:  Starting at age 35, Get screened for diabetes at least every 3 years.  If you are younger than age 35, ask your care team if you should be screened for diabetes.  Cholesterol test: At age 39, start having a cholesterol test every 5 years, or more often if advised.  Bone density scan (DEXA): At age 50, ask your care team if you should have this scan for osteoporosis (brittle bones).  Hepatitis C: Get tested at least once in your life.  STIs (sexually  transmitted infections)  Before age 24: Ask your care team if you should be screened for STIs.  After age 24: Get screened for STIs if you're at risk. You are at risk for STIs (including HIV) if:  You are sexually active with more than one person.  You don't use condoms every time.  You or a partner was diagnosed with a sexually transmitted infection.  If you are at risk for HIV, ask about PrEP medicine to prevent HIV.  Get tested for HIV at least once in your life, whether you are at risk for HIV or not.  Cancer screening tests  Cervical cancer screening: If you have a cervix, begin getting regular cervical cancer screening tests starting at age 21.  Breast cancer scan (mammogram): If you've ever had breasts, begin having regular mammograms starting at age 40. This is a scan to check for breast cancer.  Colon cancer screening: It is important to start screening for colon cancer at age 45.  Have a colonoscopy test every 10 years (or more often if you're at risk) Or, ask your provider about stool tests like a FIT test every year or Cologuard test every 3 years.  To learn more about your testing options, visit:   .  For help making a decision, visit:   https://bit.ly/pp61476.  Prostate cancer screening test: If you have a prostate, ask your care team if a prostate cancer screening test (PSA) at age 55 is right for you.  Lung cancer screening: If you are a current or former smoker ages 50 to 80, ask your care team if ongoing lung cancer screenings are right for you.  For informational purposes only. Not to replace the advice of your health care provider. Copyright   2023 [facility]. All rights reserved. Clinically reviewed by the [facility]. [employer] 853013 - REV 01/24.  Learning About Stress  What is stress?     Stress is your body's response to a hard situation. Your body can have a physical, emotional, or mental response. Stress is a fact of life for most people, and it  affects everyone differently. What causes stress for you may not be stressful for someone else.  A lot of things can cause stress. You may feel stress when you go on a job interview, take a test, or run a race. This kind of short-term stress is normal and even useful. It can help you if you need to work hard or react quickly. For example, stress can help you finish an important job on time.  Long-term stress is caused by ongoing stressful situations or events. Examples of long-term stress include long-term health problems, ongoing problems at work, or conflicts in your family. Long-term stress can harm your health.  How does stress affect your health?  When you are stressed, your body responds as though you are in danger. It makes hormones that speed up your heart, make you breathe faster, and give you a burst of energy. This is called the fight-or-flight stress response. If the stress is over quickly, your body goes back to normal and no harm is done.  But if stress happens too often or lasts too long, it can have bad effects. Long-term stress can make you more likely to get sick, and it can make symptoms of some diseases worse. If you tense up when you are stressed, you may develop neck, shoulder, or low back pain. Stress is linked to high blood pressure and heart disease.  Stress also harms your emotional health. It can make you elias, tense, or depressed. Your relationships may suffer, and you may not do well at work or school.  What can you do to manage stress?  You can try these things to help manage stress:   Do something active. Exercise or activity can help reduce stress. Walking is a great way to get started. Even everyday activities such as housecleaning or yard work can help.  Try yoga or alen chi. These techniques combine exercise and meditation. You may need some training at first to learn them.  Do something you enjoy. For example, listen to music or go to a movie. Practice your hobby or do volunteer  "work.  Meditate. This can help you relax, because you are not worrying about what happened before or what may happen in the future.  Do guided imagery. Imagine yourself in any setting that helps you feel calm. You can use online videos, books, or a teacher to guide you.  Do breathing exercises. For example:  From a standing position, bend forward from the waist with your knees slightly bent. Let your arms dangle close to the floor.  Breathe in slowly and deeply as you return to a standing position. Roll up slowly and lift your head last.  Hold your breath for just a few seconds in the standing position.  Breathe out slowly and bend forward from the waist.  Let your feelings out. Talk, laugh, cry, and express anger when you need to. Talking with supportive friends or family, a counselor, or a poppy leader about your feelings is a healthy way to relieve stress. Avoid discussing your feelings with people who make you feel worse.  Write. It may help to write about things that are bothering you. This helps you find out how much stress you feel and what is causing it. When you know this, you can find better ways to cope.  What can you do to prevent stress?  You might try some of these things to help prevent stress:  Manage your time. This helps you find time to do the things you want and need to do.  Get enough sleep. Your body recovers from the stresses of the day while you are sleeping.  Get support. Your family, friends, and community can make a difference in how you experience stress.  Limit your news feed. Avoid or limit time on social media or news that may make you feel stressed.  Do something active. Exercise or activity can help reduce stress. Walking is a great way to get started.  Where can you learn more?  Go to https://www.[employer].net/patiented  Enter N032 in the search box to learn more about \"Learning About Stress.\"  Current as of: October 24, 2023  Content Version: 14.3    2024 [employer]. "   Care instructions adapted under license by your healthcare professional. If you have questions about a medical condition or this instruction, always ask your healthcare professional. [employer] disclaims any warranty or liability for your use of this information.    Learning About Depression Screening  What is depression screening?  Depression screening is a way to see if you have depression symptoms. It may be done by a doctor or counselor. It's often part of a routine checkup. That's because your mental health is just as important as your physical health.  Depression is a mental health condition that affects how you feel, think, and act. You may:  Have less energy.  Lose interest in your daily activities.  Feel sad and grouchy for a long time.  Depression is very common. It affects people of all ages.  Many things can lead to depression. Some people become depressed after they have a stroke or find out they have a major illness like cancer or heart disease. The death of a loved one or a breakup may lead to depression. It can run in families. Most experts believe that a combination of inherited genes and stressful life events can cause it.  What happens during screening?  You may be asked to fill out a form about your depression symptoms. You and the doctor will discuss your answers. The doctor may ask you more questions to learn more about how you think, act, and feel.  What happens after screening?  If you have symptoms of depression, your doctor will talk to you about your options.  Doctors usually treat depression with medicines or counseling. Often, combining the two works best. Many people don't get help because they think that they'll get over the depression on their own. But people with depression may not get better unless they get treatment.  The cause of depression is not well understood. There may be many factors involved. But if you have depression, it's not your fault.  A serious symptom  "of depression is thinking about death or suicide. If you or someone you care about talks about this or about feeling hopeless, get help right away.  It's important to know that depression can be treated. Medicine, counseling, and self-care may help.  Where can you learn more?  Go to https://www.[employer].net/patiented  Enter T185 in the search box to learn more about \"Learning About Depression Screening.\"  Current as of: July 31, 2024  Content Version: 14.3    2024 [employer].   Care instructions adapted under license by your healthcare professional. If you have questions about a medical condition or this instruction, always ask your healthcare professional. [employer] disclaims any warranty or liability for your use of this information.       Patient Instructions  Let's try reducing your Effexor-SR from 150 mg to 75 mg daily, and see if hives/vivid dreams get better, and if moods still okay.     Let's also update hemoglobin and iron studies today. No other labs needed right now.     Agree with Physical Therapy and splint for carpal tunnel symptoms.     We discussed the option of \"semaglutide\" (known better as Ozempic/Wegovy shots, or Rybelsus pills). Usually the barrier is cost.   I can place an order and see if insurance is willing to pay. If so, I could have you meet with our Pharm D or Diabetes nurse educator to teach how to do injections.     Ideally try to set up a Video visit with me after taking lower dose of Effexor-XR for three months.     "

## 2025-01-08 NOTE — PROGRESS NOTES
Preventive Care Visit  [facility]  Triston Licona MD, Internal Medicine  Jan 8, 2025  {Provider  Link to [facility] :[id number]}    Assessment & Plan   (Z00.00) Routine general medical examination at a health care facility  (primary encounter diagnosis)  Comment: Stable health. See epic orders.     (F41.9) Anxiety  (F33.1) Moderate episode of recurrent major depressive disorder (H)  Comment: Will try reducing dose of Effexor XR to 75 mg daily.   Plan: venlafaxine (EFFEXOR XR) 75 MG 24 hr capsule,         OFFICE/OUTPT VISIT,EST,LEVL III          (E66.813,  E66.01,  Z68.44) Class 3 severe obesity due to excess calories without serious comorbidity with body mass index (BMI) of 60.0 to 69.9 in adult (H)  Comment: Offered an Rx for Wegovy, will check to see if insurance might cover.   Plan: semaglutide-weight management (WEGOVY) 0.25         MG/0.5ML pen          (N91.2) Anovulatory amenorrhea  Plan: semaglutide-weight management (WEGOVY) 0.25         MG/0.5ML pen          (D64.9) Anemia, unspecified type  Comment: See epic orders.   Plan: CBC with platelets              Patient has been advised of split billing requirements and indicates understanding: Yes        Counseling  Appropriate preventive services were addressed with this patient via screening, questionnaire, or discussion as appropriate for fall prevention, nutrition, physical activity, Tobacco-use cessation, social engagement, weight loss and cognition.  Checklist reviewing preventive services available has been given to the patient.  Reviewed patient's diet, addressing concerns and/or questions.   She is at risk for psychosocial distress and has been provided with information to reduce risk.   The patient's PHQ-9 score is consistent with mild depression. She was provided with information regarding depression.       Patient Instructions  Let's try reducing your Effexor-SR from 150 mg to 75 mg daily, and see if hives/vivid dreams get  "better, and if moods still okay.     Let's also update hemoglobin and iron studies today. No other labs needed right now.     Agree with Physical Therapy and splint for carpal tunnel symptoms.     We discussed the option of \"semaglutide\" (known better as Ozempic/Wegovy shots, or Rybelsus pills). Usually the barrier is cost.   I can place an order and see if insurance is willing to pay. If so, I could have you meet with our Pharm D or Diabetes nurse educator to teach how to do injections.     Ideally try to set up a Video visit with me after taking lower dose of Effexor-XR for three months.           Adelina Simmons is a 34 year old, presenting for the following:  Physical        1/8/2025     9:29 AM   Additional Questions   Roomed by Roxane RIVERA  In addition to a preventive exam, we addressed anxiety/depressive symptoms, obesity, carpal tunnel symptoms, episodic hives,     She has seen Janessa Mckinley PA-C of endocrinology on 5/20/2024 for hirsutism and oligomenorrhea.   She has seen Xavier Woodard MD of OB-GYN most recently on 1/12/2024--Pap/HPV negative.     She describes having problems with \"vivid dreams\" and \"hives\" with taking Effexor XR at the 150 mg daily dose. She also notes that her range of emotions have become \"suppressed too much\".   She is in favor of reducing the dose without discontinuing it or switching to an alternative. Discussed trying 112.5 mg once daily, but she is concerned that insurance would not cover two separate pill doses, and is reluctant to pay out of pocket for either pill. We agreed to try reducing her dose to 75 mg daily.     She has noted resolution of previous hives since around [place] since meeting with an allergist and taking cetirizine 10 mg daily.     She was noted to have a slight anemia by her allergist who encouraged her to have iron studies checked.     We discussed the option of trying semaglutide injections for weight reduction.     She has had some " numbness/tingling in her hand but does not feel as though she is interested in a surgery referral.     Health Care Directive  Patient does not have a Health Care Directive: Discussed advance care planning with patient; however, patient declined at this time.      1/3/2025   General Health   How would you rate your overall physical health? (!) POOR   Feel stress (tense, anxious, or unable to sleep) To some extent   (!) STRESS CONCERN      1/3/2025   Nutrition   Three or more servings of calcium each day? (!) I DON'T KNOW   Diet: Regular (no restrictions)   How many servings of fruit and vegetables per day? (!) 0-1   How many sweetened beverages each day? 0-1         1/3/2025   Exercise   Days per week of moderate/strenous exercise 0 days   Average minutes spent exercising at this level 0 min   (!) EXERCISE CONCERN      1/3/2025   Social Factors   Frequency of gathering with friends or relatives Patient declined   Worry food won't last until get money to buy more No   Food not last or not have enough money for food? No   Do you have housing? (Housing is defined as stable permanent housing and does not include staying ouside in a car, in a tent, in an abandoned building, in an overnight shelter, or couch-surfing.) Yes   Are you worried about losing your housing? No   Lack of transportation? No   Unable to get utilities (heat,electricity)? No         1/3/2025   Dental   Dentist two times every year? Yes         1/3/2025   TB Screening   Were you born outside of the [place]? No       Today's PHQ-9 Score:       1/8/2025     9:32 AM   PHQ-9 SCORE   PHQ-9 Total Score 9         1/3/2025   Substance Use   Alcohol more than 3/day or more than 7/wk Not Applicable   Do you use any other substances recreationally? No     Social History     Tobacco Use    Smoking status: Never    Smokeless tobacco: Never   Vaping Use    Vaping status: Never Used   Substance Use Topics    Alcohol use: Never    Drug use: Never     {Provider  If there  are gaps in the social history shown above, please follow the link to update and then refresh the note Link to Social and Substance History :[id number]}      2/1/2022   LAST FHS-7 RESULTS   1st degree relative breast or ovarian cancer Yes    Any relative bilateral breast cancer Unknown    Any male have breast cancer Unknown    Any ONE woman have BOTH breast AND ovarian cancer No    Any woman with breast cancer before 50yrs Unknown    2 or more relatives with breast AND/OR ovarian cancer Yes    2 or more relatives with breast AND/OR bowel cancer Yes        Proxy-reported     {If any of the questions to the FHS7 are answered yes, consider referral for genetic counseling.    Additional indications for genetic referral include personal history of breast or ovarian cancer, genetic mutation in 1st degree relative which increases risk of breast cancer including BRCA1, BRCA2, GINI, PALB 2, TP53, CHEK2, PTEN, CDH1, STK11 (per ACS) and/or 1st degree relative with history of pancreatic or high-risk prostate cancer (per NCCN):[id number]}   Mammogram Screening - Patient under 40 years of age: Routine Mammogram Screening not recommended.         1/3/2025   STI Screening   New sexual partner(s) since last STI/HIV test? No     History of abnormal Pap smear: No - age 30-64 HPV with reflex Pap every 5 years recommended        Latest Ref Rng & Units 1/12/2024     2:06 PM   PAP / HPV   PAP  Negative for Intraepithelial Lesion or Malignancy (NILM)    HPV 16 DNA Negative Negative    HPV 18 DNA Negative Negative    Other HR HPV Negative Negative            1/3/2025   Contraception/Family Planning   Questions about contraception or family planning No     {Provider  REQUIRED FOR AWV Use the storyboard to review patient history, after sections have been marked as reviewed, refresh note to capture documentation:[id number]}   Reviewed and updated as needed this visit by Provider                    Past medical, family and social histories as well as  "medications reviewed and updated as needed.    REVIEW OF SYSTEMS: The following systems have been completely reviewed and are negative except as noted above:   Constitutional, HEENT, respiratory, cardiovascular, gastrointestinal, genitourinary, musculoskeletal, dermatologic, hematologic, endocrine, psychiatric, and neurologic systems.       Objective    Exam  /78 (BP Location: Right arm, Patient Position: Sitting, Cuff Size: Adult Large)   Pulse 119   Temp 97.8  F (36.6  C) (Tympanic)   Resp 20   Ht 1.6 m (5' 3\")   Wt (!) 165.1 kg (364 lb)   LMP 12/14/2024 (Approximate)   SpO2 97%   Breastfeeding No   BMI 64.48 kg/m     Estimated body mass index is 64.48 kg/m  as calculated from the following:    Height as of this encounter: 1.6 m (5' 3\").    Weight as of this encounter: 165.1 kg (364 lb).    Physical Exam  GENERAL: healthy, alert and no distress  EYES: Eyes grossly normal to inspection, PERRL and conjunctivae and sclerae normal  HENT: ear canals and TM's normal, nose and mouth without ulcers or lesions  NECK: no adenopathy, no asymmetry, masses, or scars and thyroid normal to palpation  RESP: lungs clear to auscultation - no rales, rhonchi or wheezes  BREAST/PELVIC: exams not performed.  CV: regular rate and rhythm, normal S1 S2, no S3 or S4, no murmur, click or rub, no peripheral edema and peripheral pulses strong  ABDOMEN: soft, nontender, no hepatosplenomegaly, no masses and bowel sounds normal  MS: no gross musculoskeletal defects noted, no edema  SKIN: no suspicious lesions or rashes  NEURO: Normal strength and tone, mentation intact and speech normal  PSYCH: mentation appears normal, affect normal/bright         Signed Electronically by: Triston Licona MD, MD  {Email feedback regarding this note to [email or web address]   :[id number]}  "

## 2025-01-09 NOTE — TELEPHONE ENCOUNTER
PA Initiation    Medication: WEGOVY 0.25 MG/0.5ML SC SOAJ  Insurance Company: [employer] Clinical Review - Phone [phone] Fax [phone]  Pharmacy Filling the Rx: [id number] [place], [place] - [address] [facility] [address] [facility] [address] [facility] [address]  Filling Pharmacy Phone: [phone]  Filling Pharmacy Fax: [phone]  Start Date: 1/9/2025       Sent in portal

## 2025-01-09 NOTE — TELEPHONE ENCOUNTER
PRIOR AUTHORIZATION DENIED    Medication: WEGOVY 0.25 MG/0.5ML SC SOAJ    Insurance Company: [employer] Clinical Review - Phone [phone] Fax [phone]    Denial Date: 1/9/2025    Denial Reason(s):               Appeal Information:

## 2025-01-14 ENCOUNTER — THERAPY VISIT (OUTPATIENT)
Dept: OCCUPATIONAL THERAPY | Facility: CLINIC | Age: 35
End: 2025-01-14
Payer: COMMERCIAL

## 2025-01-14 DIAGNOSIS — M79.641 PAIN OF RIGHT HAND: ICD-10-CM

## 2025-01-14 DIAGNOSIS — M77.8 FLEXOR TENDINITIS OF HAND: Primary | ICD-10-CM

## 2025-01-14 DIAGNOSIS — G56.01 CARPAL TUNNEL SYNDROME OF RIGHT WRIST: ICD-10-CM

## 2025-01-14 PROCEDURE — 97140 MANUAL THERAPY 1/> REGIONS: CPT | Mod: GO | Performed by: OCCUPATIONAL THERAPIST

## 2025-01-14 PROCEDURE — 97112 NEUROMUSCULAR REEDUCATION: CPT | Mod: GO | Performed by: OCCUPATIONAL THERAPIST

## 2025-01-15 ENCOUNTER — MYC MEDICAL ADVICE (OUTPATIENT)
Dept: INTERNAL MEDICINE | Facility: CLINIC | Age: 35
End: 2025-01-15
Payer: COMMERCIAL

## 2025-01-16 NOTE — TELEPHONE ENCOUNTER
Sent [employer] message to patient.    Thank you,  Will, Triage SONDRA Parrish    12:53 PM 1/16/2025

## 2025-01-17 ENCOUNTER — MYC MEDICAL ADVICE (OUTPATIENT)
Dept: ALLERGY | Facility: CLINIC | Age: 35
End: 2025-01-17
Payer: COMMERCIAL

## 2025-01-28 ENCOUNTER — MYC MEDICAL ADVICE (OUTPATIENT)
Dept: ALLERGY | Facility: CLINIC | Age: 35
End: 2025-01-28
Payer: COMMERCIAL

## 2025-03-10 ENCOUNTER — PATIENT OUTREACH (OUTPATIENT)
Dept: CARE COORDINATION | Facility: CLINIC | Age: 35
End: 2025-03-10
Payer: COMMERCIAL

## 2025-03-18 ENCOUNTER — OFFICE VISIT (OUTPATIENT)
Dept: ALLERGY | Facility: CLINIC | Age: 35
End: 2025-03-18
Attending: INTERNAL MEDICINE
Payer: COMMERCIAL

## 2025-03-18 VITALS
HEART RATE: 98 BPM | OXYGEN SATURATION: 97 % | SYSTOLIC BLOOD PRESSURE: 134 MMHG | BODY MASS INDEX: 65.9 KG/M2 | RESPIRATION RATE: 24 BRPM | DIASTOLIC BLOOD PRESSURE: 81 MMHG | WEIGHT: 293 LBS

## 2025-03-18 DIAGNOSIS — G47.09 OTHER INSOMNIA: Primary | ICD-10-CM

## 2025-03-18 DIAGNOSIS — L50.9 HIVES: ICD-10-CM

## 2025-03-18 PROCEDURE — 99214 OFFICE O/P EST MOD 30 MIN: CPT | Performed by: INTERNAL MEDICINE

## 2025-03-18 NOTE — PROGRESS NOTES
Iris Toscano was seen in the Allergy Clinic at [facility].    Iris Toscano is a 34 year old female being seen today for ongoing evaluation of Hives     Since the last visit the patient has been struggling with sleep.  She has tried different doses of her antihistamines.  She is wondering if Zyrtec is contributing to vivid dreams.  However she was having problems with vivid dreams prior.  She is currently using Zyrtec 20 mg at night.  At times she has insomnia and then is sleepy throughout the day.  There is times that she is not falling asleep until 5 in the morning.  Last night she was up until 4 AM, however she mentions her new video game was part of her staying up late.     She also feels that her vivid dreams are contributing to her ability did not sleep well.    Overall the hives are better controlled and they were couple months ago.  However she is still having hives intermittently.    Past Medical History:   Diagnosis Date    Anxiety     Asperger's syndrome     Depressive disorder      Family History   Problem Relation Age of Onset    Breast Cancer Mother     Cancer Mother         leukemia    Lipids Mother     Thyroid Disease Mother     Hyperlipidemia Mother         not medicated for it    Other Cancer Mother         leukemia and relapse    Lipids Father     Hyperlipidemia Father     Anxiety Disorder Father     Mental Illness Father         primary progressive aphasia    Lipids Sister     Asthma Sister     Hypertension Maternal Grandmother     Cerebrovascular Disease Maternal Grandmother     Breast Cancer Maternal Grandmother     C.A.D. Maternal Grandmother     Diabetes Maternal Grandfather     Cancer Maternal Aunt         breast    Depression Maternal Aunt     Lipids Other         grandparents    Diabetes Other         Maternal uncle    Diabetes Other         Maternal aunt    Breast Cancer Other         maternal aunt    Breast Cancer Other         maternal aunt    Breast Cancer  Other         maternal aunt    Colon Cancer Other         maternal aunt; aggressive rectal cancer killed her     Past Surgical History:   Procedure Laterality Date    ADENOIDECTOMY      As a child    HERNIA REPAIR  1997? 98? lakia? yeny i was a child    inguinal hernia repair    INGUINAL HERNIA REPAIR      ?side, at age 8    IR LUMBAR PUNCTURE  02/22/2013    MYRINGOTOMY BILATERAL      As a child    WISDOM TOOTH EXTRACTION  2013         Current Outpatient Medications:     cetirizine (ZYRTEC) 10 MG tablet, Take 10 mg by mouth daily., Disp: , Rfl:     medroxyPROGESTERone (PROVERA) 5 MG tablet, Take 1 pill on Day #1-12 each month., Disp: 36 tablet, Rfl: 4    semaglutide-weight management (WEGOVY) 0.25 MG/0.5ML pen, Inject 0.5 mLs (0.25 mg) subcutaneously once a week., Disp: 2 mL, Rfl: 0    traZODone (DESYREL) 50 MG tablet, Take 0.5-2 tablets ( mg) by mouth nightly as needed for sleep, Disp: 30 tablet, Rfl: 3    venlafaxine (EFFEXOR XR) 75 MG 24 hr capsule, Take 1 capsule (75 mg) by mouth daily., Disp: 30 capsule, Rfl: 11  No Known Allergies      EXAM:   /81   Pulse 98   Resp 24   Wt (!) 168.7 kg (372 lb)   SpO2 97%   BMI 65.90 kg/m      Constitutional:       General: She is tearful today  HENT:      Head: Normocephalic and atraumatic.     Eyes:      General:         Right eye: No discharge.         Left eye: No discharge.   Skin:     Findings: No erythema or rash.   Neurological:      General: No focal deficit present.      Mental Status: She is alert. Mental status is at baseline.   Psychiatric:         Mood and Affect: Mood is upset and frustrated.          ASSESSMENT/PLAN:  Iris Toscano is a 34 year old female seen today for ongoing evaluation of urticaria.  Will make adjustments in her medications.  Allegra likely will have fewer side effects compared to Zyrtec which does cross the blood-brain barrier.  This may contribute to fatigue.    Fexofenadine 180 mg may take up to 4 pills in a day may have  fewer side effects compared to Zyrtec.    Or consider taking 10 mg of Zyrtec with 180 mg of Allegra at night  Sleep evaluation ordered due to fatigue and insomnia.  Xolair is a consideration    Follow-up if hives continue      Thank you for allowing me to participate in the care of Iris Toscano.      I spent 35 minutes on the date of the encounter doing chart review, history and exam, documentation and further coordination as noted above exclusive of separately reported interpretations    Brian Mckeon MD  Allergy/Immunology  [facility]

## 2025-03-18 NOTE — LETTER
3/18/2025      Iris Toscano  [address] [facility] [address]      Dear Colleague,    Thank you for referring your patient, Iris Toscano, to the [facility]. Please see a copy of my visit note below.    Iris Toscano was seen in the Allergy Clinic at [facility].    Iris Toscano is a 34 year old female being seen today for ongoing evaluation of Hives     Since the last visit the patient has been struggling with sleep.  She has tried different doses of her antihistamines.  She is wondering if Zyrtec is contributing to vivid dreams.  However she was having problems with vivid dreams prior.  She is currently using Zyrtec 20 mg at night.  At times she has insomnia and then is sleepy throughout the day.  There is times that she is not falling asleep until 5 in the morning.  Last night she was up until 4 AM, however she mentions her new video game was part of her staying up late.     She also feels that her vivid dreams are contributing to her ability did not sleep well.    Overall the hives are better controlled and they were couple months ago.  However she is still having hives intermittently.    Past Medical History:   Diagnosis Date     Anxiety      Asperger's syndrome      Depressive disorder      Family History   Problem Relation Age of Onset     Breast Cancer Mother      Cancer Mother         leukemia     Lipids Mother      Thyroid Disease Mother      Hyperlipidemia Mother         not medicated for it     Other Cancer Mother         leukemia and relapse     Lipids Father      Hyperlipidemia Father      Anxiety Disorder Father      Mental Illness Father         primary progressive aphasia     Lipids Sister      Asthma Sister      Hypertension Maternal Grandmother      Cerebrovascular Disease Maternal Grandmother      Breast Cancer Maternal Grandmother      ENMANUELALULU Maternal Grandmother      Diabetes Maternal Grandfather      Cancer Maternal Aunt          breast     Depression Maternal Aunt      Lipids Other         grandparents     Diabetes Other         Maternal uncle     Diabetes Other         Maternal aunt     Breast Cancer Other         maternal aunt     Breast Cancer Other         maternal aunt     Breast Cancer Other         maternal aunt     Colon Cancer Other         maternal aunt; aggressive rectal cancer killed her     Past Surgical History:   Procedure Laterality Date     ADENOIDECTOMY      As a child     HERNIA REPAIR  1997? 98? lakia? yeny i was a child    inguinal hernia repair     INGUINAL HERNIA REPAIR      ?side, at age 8     IR LUMBAR PUNCTURE  02/22/2013     MYRINGOTOMY BILATERAL      As a child     WISDOM TOOTH EXTRACTION  2013         Current Outpatient Medications:      cetirizine (ZYRTEC) 10 MG tablet, Take 10 mg by mouth daily., Disp: , Rfl:      medroxyPROGESTERone (PROVERA) 5 MG tablet, Take 1 pill on Day #1-12 each month., Disp: 36 tablet, Rfl: 4     semaglutide-weight management (WEGOVY) 0.25 MG/0.5ML pen, Inject 0.5 mLs (0.25 mg) subcutaneously once a week., Disp: 2 mL, Rfl: 0     traZODone (DESYREL) 50 MG tablet, Take 0.5-2 tablets ( mg) by mouth nightly as needed for sleep, Disp: 30 tablet, Rfl: 3     venlafaxine (EFFEXOR XR) 75 MG 24 hr capsule, Take 1 capsule (75 mg) by mouth daily., Disp: 30 capsule, Rfl: 11  No Known Allergies      EXAM:   /81   Pulse 98   Resp 24   Wt (!) 168.7 kg (372 lb)   SpO2 97%   BMI 65.90 kg/m      Constitutional:       General: She is tearful today  HENT:      Head: Normocephalic and atraumatic.     Eyes:      General:         Right eye: No discharge.         Left eye: No discharge.   Skin:     Findings: No erythema or rash.   Neurological:      General: No focal deficit present.      Mental Status: She is alert. Mental status is at baseline.   Psychiatric:         Mood and Affect: Mood is upset and frustrated.          ASSESSMENT/PLAN:  rIis Toscano is a 34 year old female seen  today for ongoing evaluation of urticaria.  Will make adjustments in her medications.  Allegra likely will have fewer side effects compared to Zyrtec which does cross the blood-brain barrier.  This may contribute to fatigue.    Fexofenadine 180 mg may take up to 4 pills in a day may have fewer side effects compared to Zyrtec.    Or consider taking 10 mg of Zyrtec with 180 mg of Allegra at night  Sleep evaluation ordered due to fatigue and insomnia.  Xolair is a consideration    Follow-up if hives continue      Thank you for allowing me to participate in the care of Iris Toscano.      I spent 35 minutes on the date of the encounter doing chart review, history and exam, documentation and further coordination as noted above exclusive of separately reported interpretations    Brian Mckeon MD  Allergy/Immunology  [facility]       Again, thank you for allowing me to participate in the care of your patient.        Sincerely,        Brian Mckeon MD    Electronically signed

## 2025-03-18 NOTE — PATIENT INSTRUCTIONS
Fexofenadine 180 mg may take up to 4 pills in a day may have fewer side effects compared to Zyrtec.    Or consider taking 10 mg of Zyrtec with 180 mg of Allegra at night  Sleep evaluation ordered due to fatigue and insomnia.  Xolair is a consideration            Allergy Staff Appt Hours Shot Hours Location       Physician   Brian Mckeon MD      Support Staff   SONDRA Senior RN, MA Emily J., MA      Mondays Tuesdays Thursdays and Fridays:      Cathy 7-5      Wednesdays         Close                Mondays, Tuesdays and Fridays:  7:20 - 3:40              [facility]  [address]  [place] [address]  Allergy appointment  line: [phone]    Pulmonary Function Scheduling:  [place]: [phone]           Questions about cost of your care  For questions about your cost of your visit, procedure, lab or imaging contact: [employer] [phone] or visit:  [email or web address]    Prescription Assistance  If you need assistance with your prescriptions (cost, coverage, etc) please contact: [employer] Prescription Assistance Program [phone]    Important Scheduling Information  All visits for food challenges, medication/drug allergy testing, and drug challenges MUST be scheduled through the allergy clinic nurse. Please contact them via [employer] or by calling the clinic at [phone] and asking to speak with an allergy nurse. They will provide additional information and instructions for the appointment. Discontinue oral antihistamines 7 days prior to the appointment. Discontinue nasal and ocular antihistamines 1 day prior to the appointment.    Appointments for skin testing: Appointment will last approximately 45 minutes.  Please call the appointment line for your clinic to schedule.  Discontinue oral antihistamines 7 days prior to the appointment.  Discontinue nasal and ocular antihistamines 1 days prior  to appointment.    Thank you for trusting us with your care. Please feel free to contact us with any questions or concerns you may have.

## 2025-03-20 ENCOUNTER — OFFICE VISIT (OUTPATIENT)
Dept: SLEEP MEDICINE | Facility: CLINIC | Age: 35
End: 2025-03-20
Payer: COMMERCIAL

## 2025-03-20 VITALS
OXYGEN SATURATION: 96 % | HEART RATE: 95 BPM | DIASTOLIC BLOOD PRESSURE: 88 MMHG | BODY MASS INDEX: 51.91 KG/M2 | SYSTOLIC BLOOD PRESSURE: 133 MMHG | WEIGHT: 293 LBS | HEIGHT: 63 IN

## 2025-03-20 DIAGNOSIS — E66.01 MORBID OBESITY (H): ICD-10-CM

## 2025-03-20 DIAGNOSIS — F33.1 MODERATE EPISODE OF RECURRENT MAJOR DEPRESSIVE DISORDER (H): ICD-10-CM

## 2025-03-20 DIAGNOSIS — R06.83 SNORING: Primary | ICD-10-CM

## 2025-03-20 DIAGNOSIS — F41.9 ANXIETY: ICD-10-CM

## 2025-03-20 DIAGNOSIS — G47.09 OTHER INSOMNIA: ICD-10-CM

## 2025-03-20 ASSESSMENT — SLEEP AND FATIGUE QUESTIONNAIRES
HOW LIKELY ARE YOU TO NOD OFF OR FALL ASLEEP WHILE SITTING INACTIVE IN A PUBLIC PLACE: SLIGHT CHANCE OF DOZING
HOW LIKELY ARE YOU TO NOD OFF OR FALL ASLEEP WHILE WATCHING TV: SLIGHT CHANCE OF DOZING
HOW LIKELY ARE YOU TO NOD OFF OR FALL ASLEEP WHILE SITTING AND READING: SLIGHT CHANCE OF DOZING
HOW LIKELY ARE YOU TO NOD OFF OR FALL ASLEEP WHEN YOU ARE A PASSENGER IN A CAR FOR AN HOUR WITHOUT A BREAK: MODERATE CHANCE OF DOZING
HOW LIKELY ARE YOU TO NOD OFF OR FALL ASLEEP WHILE SITTING QUIETLY AFTER LUNCH WITHOUT ALCOHOL: WOULD NEVER DOZE
HOW LIKELY ARE YOU TO NOD OFF OR FALL ASLEEP WHILE SITTING AND TALKING TO SOMEONE: WOULD NEVER DOZE
HOW LIKELY ARE YOU TO NOD OFF OR FALL ASLEEP IN A CAR, WHILE STOPPED FOR A FEW MINUTES IN TRAFFIC: WOULD NEVER DOZE
HOW LIKELY ARE YOU TO NOD OFF OR FALL ASLEEP WHILE LYING DOWN TO REST IN THE AFTERNOON WHEN CIRCUMSTANCES PERMIT: HIGH CHANCE OF DOZING

## 2025-03-20 NOTE — PROGRESS NOTES
Outpatient Sleep Medicine Consultation:      Name: Iris Toscano MRN# [id number]   Age: 34 year old YOB: 1990     Date of Consultation: March 20, 2025  Consultation is requested by: Brian Mckeon MD  [address] [facility] [address]  [place] [address] Brian Mckeon  Primary care provider: Triston Licona       Reason for Sleep Consult:     Iris Toscano is sent by Brian Mckeon for a sleep consultation regarding 1. Other insomnia    Patient s Reason for visit  Iris Toscano main reason for visit: (Patient-Rptd) frequent vivid dreams causing lack of rest, semi-regular difficulty falling asleep  Patient states problem(s) started: (Patient-Rptd) August 2024 for the dreams  Iris Toscano's goals for this visit: (Patient-Rptd) uh, I guess try to determine what's causing it and what if any treatment would be good         Assessment and Plan:     Summary Sleep Diagnoses:  1. Snoring (Primary)  2. Other insomnia  3. Morbid obesity (H)  Comorbid Diagnoses:  4. Moderate episode of recurrent major depressive disorder (H)  5. Anxiety    Patient is being evaluated for Obstructive Sleep Apnea (JODI) given snoring, unrefreshing sleep, frequent nocturia, enlarged neck girth (48 cm), obesity (BMI 65.72). No witnessed apneas but no bed partner to report this. Rarely has woken herself up gasping.  We discussed pathophysiology of JODI and consequences of untreated disease. Patient is interested in treatment and willing to undergo overnight sleep testing. Order placed today for split-night polysomnography evaluation with transcutaneous carbon dioxide monitoring to evaluate for possible coexistent hypoventilation given morbid obesity status. Discussed if testing is positive for JODI and suspected PAP therapy will be recommended and she is open to starting on this.  Titration will be helpful to determine if CPAP versus BiPAP needed for her.  Alternative explanation for her unrefreshing sleep quality could be irregular sleep  "schedule, mood disorders, delayed sleep phase tendency. Appears she is anemic as well which could be contributing.  Discussed the importance of establishing a consistent sleep schedule at the same bedtime and wake up time every day of the week and encouraged no electronics use as she admits she will stay up late playing video games. Follow-up after PSG for results discussion but may place PAP orders early to expedite treatment set up.   - Comprehensive Sleep Study; Future         History of Present Illness:     Iris Toscano is a 34 year old female with morbid obesity, Asperger's, MDD, JAYLYN, ADHD, who presents to clinic today stating \"it's not nightly but I can have vivid dreams and I am not as rested as I should be\". Wonders if venlafaxine is playing a role, decreased dose from 150mg to 75mg in January and has improved slightly.     Past Sleep Evaluations:None    SLEEP-WAKE SCHEDULE:     Work/School Days: Patient goes to school/work: (Patient-Rptd) No   Usually gets into bed at (Patient-Rptd) between 12-3 am  Takes patient about (Patient-Rptd) 5-90 minutes to fall asleep  Has trouble falling asleep (Patient-Rptd) 1-3 nights per week  Wakes up in the middle of the night (Patient-Rptd) 2-5 times - usually restroom   Wakes up due to (Patient-Rptd) External stimuli (bed partner, pets, noise, etc);Use the bathroom;Nightmares  She has trouble falling back asleep (Patient-Rptd) unsure times a week.   It usually takes (Patient-Rptd) usually quick to get back to sleep  Patient is usually up at (Patient-Rptd) anywhere from 10a-1p, or earlier from an alarm if I have an event  Uses alarm: (Patient-Rptd) Yes    Weekends/Non-work Days/All Other Days:  Usually gets into bed at (Patient-Rptd) ditto   Takes patient about (Patient-Rptd) ditto to fall asleep  Patient is usually up at (Patient-Rptd) ditto  Uses alarm: (Patient-Rptd) Yes    Sleep Need  Patient gets  (Patient-Rptd) 8 hours? but half the time it's not very restful " "sleep on average   Patient thinks she needs about (Patient-Rptd) probably 8 hours but restful sleep    Iris Toscano prefers to sleep in this position(s): (Patient-Rptd) Back;Side;Head Elevated   Patient states they do the following activities in bed: (Patient-Rptd) Read;Watch TV    Naps  Patient takes a purposeful nap (Patient-Rptd) 1-2 depending how tired i am times a week and naps are usually (Patient-Rptd) 1-3 hours in duration  She feels better after a nap: (Patient-Rptd) Yes  She dozes off unintentionally (Patient-Rptd) 0 days per week  Patient has had a driving accident or near-miss due to sleepiness/drowsiness: No    SLEEP DISRUPTIONS:  No bed partner  Breathing/Snoring  Patient snores:(Patient-Rptd) Yes - \"I don't think all the time\"  Other people complain about her snoring: (Patient-Rptd) No  Patient has been told she stops breathing in her sleep:(Patient-Rptd) No  Gasping/choking: Occasional  She has issues with the following: (Patient-Rptd) Morning mouth dryness;Stuffy nose when you wake up;Getting up to urinate more than once    Movement:  Patient gets pain, discomfort, with an urge to move:  (Patient-Rptd) No  Patient has been told she kicks her legs at night:   No     Behaviors in Sleep:  Iris Toscano has experienced the following behaviors while sleeping: (Patient-Rptd) Recurring Nightmares  - \"less often than I used to but I used to dream about something that happened in high school every month or two\"    Denies sleepwalking, sleep talking, sleep eating, bruxism, dream enactment, night terrors, sleep paralysis, hypnagogic/hypnopompic hallucinations, cataplexy      CAFFEINE AND OTHER SUBSTANCES:    Patient consumes caffeinated beverages per day:  (Patient-Rptd) 0  List of any prescribed or over the counter stimulants that patient takes:  None  List of any prescribed or over the counter sleep medication patient takes:  Trazodone - \"haven't really used I took it just once\"  List of previous " sleep medications that patient has tried:  None   Patient drinks alcohol to help them sleep: (Patient-Rptd) No  Patient drinks alcohol near bedtime: (Patient-Rptd) No    Family History:  Patient has a family member been diagnosed with a sleep disorder: Dad with snoring, unsure if dx with JODI      SCALES:    EPWORTH SLEEPINESS SCALE         3/20/2025    12:00 AM    [place] Sleepiness Scale ( SAMARIA Gibbons  [id number]<br>ESS - [place]/English - Final version - 21 Nov 07 - [facility] [employer].)   Sitting and reading Slight chance of dozing   Watching TV Slight chance of dozing   Sitting, inactive in a public place (e.g. a theatre or a meeting) Slight chance of dozing   As a passenger in a car for an hour without a break Moderate chance of dozing   Lying down to rest in the afternoon when circumstances permit High chance of dozing   Sitting and talking to someone Would never doze   Sitting quietly after a lunch without alcohol Would never doze   In a car, while stopped for a few minutes in traffic Would never doze   [place] Score (MC) 8   [place] Score (Sleep) 8        Patient-reported         INSOMNIA SEVERITY INDEX (JHONNY)          3/19/2025    11:38 PM   Insomnia Severity Index (JHONNY)   Difficulty falling asleep 2   Difficulty staying asleep 2   Problems waking up too early 1   How SATISFIED/DISSATISFIED are you with your CURRENT sleep pattern? 3   How NOTICEABLE to others do you think your sleep problem is in terms of impairing the quality of your life? 1   How WORRIED/DISTRESSED are you about your current sleep problem? 2   To what extent do you consider your sleep problem to INTERFERE with your daily functioning (e.g. daytime fatigue, mood, ability to function at work/daily chores, concentration, memory, mood, etc.) CURRENTLY? 2   JHONNY Total Score 13        Patient-reported         Guidelines for Scoring/Interpretation:  Total score categories:  0-7 = No clinically significant insomnia   8-14 = Subthreshold insomnia    15-21 = Clinical insomnia (moderate severity)  22-28 = Clinical insomnia (severe)  Used via courtesy of www.[employer].va.gov with permission from Alex Reece PhD., [facility]      GAD7        6/2/2024     1:55 PM   JAYLYN-7    1. Feeling nervous, anxious, or on edge 1   2. Not being able to stop or control worrying 0   3. Worrying too much about different things 2   4. Trouble relaxing 1   5. Being so restless that it is hard to sit still 0   6. Becoming easily annoyed or irritable 2   7. Feeling afraid, as if something awful might happen 1   JAYLYN-7 Total Score 7   If you checked any problems, how difficult have they made it for you to do your work, take care of things at home, or get along with other people? Somewhat difficult     PATIENT HEALTH QUESTIONNAIRE-9 (PHQ - 9)        1/8/2025     9:32 AM   PHQ-9 ([employer])   1.  Little interest or pleasure in doing things 1   2.  Feeling down, depressed, or hopeless 1   3.  Trouble falling or staying asleep, or sleeping too much 1   4.  Feeling tired or having little energy 2   5.  Poor appetite or overeating 2   6.  Feeling bad about yourself - or that you are a failure or have let yourself or your family down 1   7.  Trouble concentrating on things, such as reading the newspaper or watching television 1   8.  Moving or speaking so slowly that other people could have noticed. Or the opposite - being so fidgety or restless that you have been moving around a lot more than usual 0   9.  Thoughts that you would be better off dead, or of hurting yourself in some way 0   PHQ-9 Total Score 9   If you checked off any problems, how difficult have these problems made it for you to do your work, take care of things at home, or get along with other people? Very difficult   6.  Feeling bad about yourself 1   7.  Trouble concentrating 1   8.  Moving slowly or restless 0   9.  Suicidal or self-harm thoughts 0   Difficulty at work, home, or with people Very difficult        Developed by Zakia Wagner, Anastasiia Magaña, Justin Rodriguez and colleagues, with an educational bryce from [employer] No permission required to reproduce, translate, display or distribute.        Allergies:    No Known Allergies    Medications:    Current Outpatient Medications   Medication Sig Dispense Refill    cetirizine (ZYRTEC) 10 MG tablet Take 10 mg by mouth daily.      medroxyPROGESTERone (PROVERA) 5 MG tablet Take 1 pill on Day #1-12 each month. 36 tablet 4    traZODone (DESYREL) 50 MG tablet Take 0.5-2 tablets ( mg) by mouth nightly as needed for sleep 30 tablet 3    venlafaxine (EFFEXOR XR) 75 MG 24 hr capsule Take 1 capsule (75 mg) by mouth daily. 30 capsule 11       Problem List:  Patient Active Problem List    Diagnosis Date Noted    Anovulatory amenorrhea 02/14/2024     Priority: Medium    Irregular menses 01/12/2024     Priority: Medium    Hirsutism 01/12/2024     Priority: Medium    Numbness 08/13/2022     Priority: Medium    Moderate episode of recurrent major depressive disorder (H) 02/10/2022     Priority: Medium    Anxiety 02/10/2022     Priority: Medium    Asperger's disorder 07/24/2020     Priority: Medium    Class 3 severe obesity due to excess calories without serious comorbidity with body mass index (BMI) of 60.0 to 69.9 in adult (H) 01/28/2013     Priority: Medium    Benign intracranial hypertension 01/28/2013     Priority: Medium    Depressive disorder 02/09/2005     Priority: Medium     Formatting of this note might be different from the original.   With episodic irritability   ; Depressive disorder, not elsewhere classified ([facility])      Attention deficit hyperactivity disorder (ADHD) 10/20/2004     Priority: Medium    Oppositional defiant disorder 10/20/2004     Priority: Medium     Improved  Epic          Past Medical/Surgical History:  Past Medical History:   Diagnosis Date    Anxiety     Asperger's syndrome     Depressive disorder      Past Surgical History:    Procedure Laterality Date    ADENOIDECTOMY      As a child    HERNIA REPAIR  ? 89? lakia? yeny i was a child    inguinal hernia repair    INGUINAL HERNIA REPAIR      ?side, at age 8    IR LUMBAR PUNCTURE  2013    MYRINGOTOMY BILATERAL      As a child    WISDOM TOOTH EXTRACTION         Social History:  Social History     Socioeconomic History    Marital status: Single     Spouse name: Not on file    Number of children: Not on file    Years of education: Not on file    Highest education level: Not on file   Occupational History    Not on file   Tobacco Use    Smoking status: Never    Smokeless tobacco: Never   Vaping Use    Vaping status: Never Used   Substance and Sexual Activity    Alcohol use: Never    Drug use: Never    Sexual activity: Never     Birth control/protection: None   Other Topics Concern    Parent/sibling w/ CABG, MI or angioplasty before 65F 55M? No   Social History Narrative    FAMILY INFORMATION     Date: 2011    Parent #1      Name: Damion Toscano   Gender: male   : 1957      Occupation:         Parent #2      Name: Elzbieta Toscano   Gender: female   : 1951     Occupation: Homemaker        Siblings:  Name: Elisabet Toscano    : 1994        Relationship Status of Parent(s):     Who does the child live with? mother, father and sister(s)    What language(s) is/are spoken at home? English    Child's Country of Birth? [place]                  Social Drivers of Health     Financial Resource Strain: Low Risk  (1/3/2025)    Financial Resource Strain     Within the past 12 months, have you or your family members you live with been unable to get utilities (heat, electricity) when it was really needed?: No   Food Insecurity: Low Risk  (1/3/2025)    Food Insecurity     Within the past 12 months, did you worry that your food would run out before you got money to buy more?: No     Within the past 12 months, did the food you bought just  not last and you didn t have money to get more?: No   Transportation Needs: Low Risk  (1/3/2025)    Transportation Needs     Within the past 12 months, has lack of transportation kept you from medical appointments, getting your medicines, non-medical meetings or appointments, work, or from getting things that you need?: No   Physical Activity: Inactive (1/3/2025)    Exercise Vital Sign     Days of Exercise per Week: 0 days     Minutes of Exercise per Session: 0 min   Stress: Stress Concern Present (1/3/2025)    [employer] - Occupational Stress Questionnaire     Feeling of Stress : To some extent   Social Connections: Unknown (1/3/2025)    Social Connection and Isolation Panel [NHANES]     Frequency of Communication with Friends and Family: Not on file     Frequency of Social Gatherings with Friends and Family: Patient declined     Attends Jewish Services: Not on file     Active Member of Clubs or Organizations: Not on file     Attends Club or Organization Meetings: Not on file     Marital Status: Not on file   Interpersonal Safety: Low Risk  (1/8/2025)    Interpersonal Safety     Do you feel physically and emotionally safe where you currently live?: Yes     Within the past 12 months, have you been hit, slapped, kicked or otherwise physically hurt by someone?: No     Within the past 12 months, have you been humiliated or emotionally abused in other ways by your partner or ex-partner?: No   Housing Stability: Low Risk  (1/3/2025)    Housing Stability     Do you have housing? : Yes     Are you worried about losing your housing?: No       Family History:  Family History   Problem Relation Age of Onset    Breast Cancer Mother     Cancer Mother         leukemia    Lipids Mother     Thyroid Disease Mother     Hyperlipidemia Mother         not medicated for it    Other Cancer Mother         leukemia and relapse    Lipids Father     Hyperlipidemia Father     Anxiety Disorder Father     Mental  "Illness Father         primary progressive aphasia    Lipids Sister     Asthma Sister     Hypertension Maternal Grandmother     Cerebrovascular Disease Maternal Grandmother     Breast Cancer Maternal Grandmother     C.A.D. Maternal Grandmother     Diabetes Maternal Grandfather     Cancer Maternal Aunt         breast    Depression Maternal Aunt     Lipids Other         grandparents    Diabetes Other         Maternal uncle    Diabetes Other         Maternal aunt    Breast Cancer Other         maternal aunt    Breast Cancer Other         maternal aunt    Breast Cancer Other         maternal aunt    Colon Cancer Other         maternal aunt; aggressive rectal cancer killed her       Review of Systems:  In the last TWO WEEKS have you experienced any of the following symptoms?  Fevers: (Patient-Rptd) No  Night Sweats: (Patient-Rptd) Yes  Weight Gain: (Patient-Rptd) No  Pain at Night: (Patient-Rptd) No  Double Vision: (Patient-Rptd) No  Changes in Vision: (Patient-Rptd) No  Difficulty Breathing through Nose: (Patient-Rptd) Yes  Sore Throat in Morning: (Patient-Rptd) No  Dry Mouth in the Morning: (Patient-Rptd) Yes  Shortness of Breath Lying Flat: (Patient-Rptd) No  Shortness of Breath With Activity: (Patient-Rptd) Yes  Awakening with Shortness of Breath: (Patient-Rptd) No  Increased Cough: (Patient-Rptd) No  Heart Racing at Night: (Patient-Rptd) No  Swelling in Feet or Legs: (Patient-Rptd) No  Diarrhea at Night: (Patient-Rptd) No  Heartburn at Night: (Patient-Rptd) No  Urinating More than Once at Night: (Patient-Rptd) Yes  Losing Control of Urine at Night: (Patient-Rptd) No  Joint Pains at Night: (Patient-Rptd) No  Headaches in Morning: (Patient-Rptd) No  Weakness in Arms or Legs: (Patient-Rptd) No  Depressed Mood: (Patient-Rptd) Yes  Anxiety: (Patient-Rptd) Yes     Physical Examination:  Vitals: /88   Pulse 95   Ht 1.6 m (5' 3\")   Wt (!) 168.3 kg (371 lb)   SpO2 96%   BMI 65.72 kg/m    BMI= Body mass index is " 65.72 kg/m .  General appearance: Awake, alert, cooperative. Well groomed. Sitting comfortably in chair. In no apparent distress.  HEENT: Head: Normocephalic, atraumatic. Eyes: PERRL. Conjunctiva clear. Sclera normal. Nose: External appearance normal.   Neck: Neck Cir (cm): 48 cm (3/20/2025  8:00 AM)  Skin: No rashes or significant lesions.   Neurologic: Alert, oriented x3. No focal neurological deficit. Gait normal.   Psychiatric: Mood euthymic. Affect congruent with full range and intensity.         Data: All pertinent previous laboratory data reviewed     Recent Labs   Lab Test 12/19/24  1431 05/30/24  0814    140   POTASSIUM 4.3 4.6   CHLORIDE 102 106   CO2 24 22   ANIONGAP 12 12   * 87   BUN 13.6 10.9   CR 0.86 0.77   SCAR 9.1 8.9       Recent Labs   Lab Test 01/08/25  1041   WBC 9.2   RBC 5.19   HGB 11.3*   HCT 36.1   MCV 70*   MCH 21.8*   MCHC 31.3*   RDW 17.3*          Recent Labs   Lab Test 12/19/24  1431   PROTTOTAL 7.6   ALBUMIN 4.2   BILITOTAL 0.4   ALKPHOS 87   AST 25   ALT 24       TSH   Date Value   12/19/2024 2.60 uIU/mL   05/30/2024 4.78 uIU/mL (H)   04/04/2022 2.38 mU/L   02/17/2022 7.10 mU/L (H)   07/24/2020 2.81 mU/L   02/06/2013 3.95 mU/L       Amphetamine Qual Urine (no units)   Date Value   01/26/2012     Negative   Cutoff for a negative amphetamine is 500 ng/mL or less.     Barbiturates Qual Urine (no units)   Date Value   01/26/2012     Negative   Cutoff for a negative barbiturate is 200 ng/mL or less.     Benzodiazepine Qual Urine (no units)   Date Value   01/26/2012     Negative   Cutoff for a negative benzodiazepine is 200 ng/mL or less.     Cannabinoids Qual Urine (no units)   Date Value   01/26/2012     Negative   Cutoff for a negative cannabinoid is 50 ng/mL or less.     Cocaine Qual Urine (no units)   Date Value   01/26/2012     Negative   Cutoff for a negative cocaine is 300 ng/mL or less.     Opiates Qualitative Urine (no units)   Date Value   01/26/2012      "Negative   Cutoff for a negative opiate is 300 ng/mL or less.       Iron Sat Index   Date/Time Value Ref Range Status   01/08/2025 10:41 AM 8 (L) 15 - 46 % Final   04/04/2022 12:32 PM 12 (L) 15 - 46 % Final     Ferritin   Date/Time Value Ref Range Status   01/08/2025 10:41 AM 34 6 - 175 ng/mL Final       No results found for: \"PH\", \"PHARTERIAL\", \"PO2\", \"[id number]\", \"SAT\", \"PCO2\", \"HCO3\", \"BASEEXCESS\", \"HALINA\", \"BEB\"    @LABRCNTIPR(phv:4,pco2v:4,po2v:4,hco3v:4,trisha:4,o2per:4)@    Echocardiology: No results found for this or any previous visit (from the past 4320 hours).    Chest x-ray: No results found for this or any previous visit from the past 365 days.      Chest CT: No results found for this or any previous visit from the past 365 days.      PFT: Most Recent [facility] Pulmonary Function Testing    No results found for: \"20001\"  No results found for: \"20002\"  No results found for: \"20003\"  No results found for: \"20015\"  No results found for: \"20016\"  No results found for: \"20027\"  No results found for: \"20028\"  No results found for: \"20029\"  No results found for: \"20079\"  No results found for: \"20080\"  No results found for: \"20081\"  No results found for: \"20335\"  No results found for: \"20105\"  No results found for: \"20053\"  No results found for: \"20054\"  No results found for: \"20055\"      Lulu Josih PA-C 3/20/2025     [facility] [place]  [address], [place] [address]     [facility]  [address], [place] [address]    Chart documentation was completed, in part, with [employer] voice-recognition software. Even though reviewed, some grammatical, spelling, and word errors may remain.    45 minutes spent on day of encounter reviewing medical records, history and physical examination, review of previous testing and interpretation, documentation and further activities as noted above  "

## 2025-03-20 NOTE — PATIENT INSTRUCTIONS
"        MY TREATMENT INFORMATION FOR SLEEP APNEA-  Iris Toscano    Frequently asked questions:  1. What is Obstructive Sleep Apnea (JODI)? JODI is the most common type of sleep apnea. Apnea means, \"without breath.\"  Apnea is most often caused by narrowing or collapse of the upper airway as muscles relax during sleep.   Almost everyone has occasional apneas. Most people with sleep apnea have had brief interruptions at night frequently for many years.  The severity of sleep apnea is related to how frequent and severe the events are.   2. What are the consequences of JODI? Symptoms include: feeling sleepy during the day, snoring loudly, gasping or stopping of breathing, trouble sleeping, and occasionally morning headaches or heartburn at night.  Sleepiness can be serious and even increase the risk of falling asleep while driving. Other health consequences may include development of high blood pressure and other cardiovascular disease in persons who are susceptible. Untreated JODI  can contribute to heart disease, stroke and diabetes.   3. What are the treatment options? In most situations, sleep apnea is a lifelong disease that must be managed with daily therapy. Medications are not effective for sleep apnea and surgery is generally not considered until other therapies have been tried. Your treatment is your choice . Continuous Positive Airway (CPAP) works right away and is the therapy that is effective in nearly everyone. An oral device to hold your jaw forward is usually the next most reliable option. Other options include postioning devices (to keep you off your back), weight loss, and surgery including a tongue pacing device. There is more detail about some of these options below.  4. Are my sleep studies covered by insurance? Although we will request verification of coverage, we advise you also check in advance of the study to ensure there is coverage.    Important tips for those choosing CPAP and similar " devices  REMEMBER-IF YOU RECEIVE A CALL FROM  [phone]-->IT IS TO SETUP A DEVICE  For new devices, sign up for device SHANTEL to monitor your device for your followup visits  We encourage you to utilize the [employer] shantel or website ( https://[employer]/ ) to monitor your therapy progress and share the data with your healthcare team when you discuss your sleep apnea.                                                    Know your equipment:  CPAP is continuous positive airway pressure that prevents obstructive sleep apnea by keeping the throat from collapsing while you are sleeping. In most cases, the device is  smart  and can slowly self-adjusts if your throat collapses and keeps a record every day of how well you are treated-this information is available to you and your care team.  BPAP is bilevel positive airway pressure that keeps your throat open and also assists each breath with a pressure boost to maintain adequate breathing.  Special kinds of BPAP are used in patients who have inadequate breathing from lung or heart disease. In most cases, the device is  smart  and can slowly self-adjusts to assist breathing. Like CPAP, the device keeps a record of how well you are treated.  Your mask is your connection to the device. You get to choose what feels most comfortable and the staff will help to make sure if fits. Here: are some examples of the different masks that are available: Magnetic mask aids may assist with use but there are safety issues that should be addressed when considering with magnets* ( see end of discussion).       Key points to remember on your journey with sleep apnea:  Sleep study.  PAP devices often need to be adjusted during a sleep study to show that they are effective and adjusted right.  Good tips to remember: Try wearing just the mask during a quiet time during the day so your body adapts to wearing it. A humidifier is recommended for comfort in most cases to prevent drying of  your nose and throat. Allergy medication from your provider may help you if you are having nasal congestion.  Getting settled-in. It takes more than one night for most of us to get used to wearing a mask. Try wearing just the mask during a quiet time during the day so your body adapts to wearing it. A humidifier is recommended for comfort in most cases. Our team will work with you carefully on the first day and will be in contact within 4 days and again at 2 and 4 weeks for advice and remote device adjustments. Your therapy is evaluated by the device each day.   Use it every night. The more you are able to sleep naturally for 7-8 hours, the more likely you will have good sleep and to prevent health risks or symptoms from sleep apnea. Even if you use it 4 hours it helps. Occasionally all of us are unable to use a medical therapy, in sleep apnea, it is not dangerous to miss one night.   Communicate. Call our skilled team on the number provided on the first day if your visit for problems that make it difficult to wear the device. Over 2 out of 3 patients can learn to wear the device long-term with help from our team. Remember to call our team or your sleep providers if you are unable to wear the device as we may have other solutions for those who cannot adapt to mask CPAP therapy. It is recommended that you sleep your sleep provider within the first 3 months and yearly after that if you are not having problems.   Use it for your health. We encourage use of CPAP masks during daytime quiet periods to allow your face and brain to adapt to the sensation of CPAP so that it will be a more natural sensation to awaken to at night or during naps. This can be very useful during the first few weeks or months of adapting to CPAP though it does not help medically to wear CPAP during wakefulness and  should not be used as a strategy just to meet guidelines.  Take care of your equipment. Make sure you clean your mask and tubing using  directions every day and that your filter and mask are replaced as recommended or if they are not working.     *Masks with magnets:  Updated Contraindications  Masks with magnetic components are contraindicated for use by patients where they, or anyone in close physical contact while using the mask, have the following:   Active medical implants that interact with magnets (i.e., pacemakers, implantable cardioverter defibrillators (ICD), neurostimulators, cerebrospinal fluid (CSF) shunts, insulin/infusion pumps)   Metallic implants/objects containing ferromagnetic material (i.e., aneurysm clips/flow disruption devices, embolic coils, stents, valves, electrodes, implants to restore hearing or balance with implanted magnets, ocular implants, metallic splinters in the eye)  Updated Warning  Keep the mask magnets at a safe distance of at least 6 inches (150 mm) away from implants or medical devices that may be adversely affected by magnetic interference. This warning applies to you or anyone in close physical contact with your mask. The magnets are in the frame and lower headgear clips, with a magnetic field strength of up to 400mT. When worn, they connect to secure the mask but may inadvertently detach while asleep.  Implants/medical devices, including those listed within contraindications, may be adversely affected if they change function under external magnetic fields or contain ferromagnetic materials that attract/repel to magnetic fields (some metallic implants, e.g., contact lenses with metal, dental implants, metallic cranial plates, screws, karen hole covers, and bone substitute devices). Consult your physician and  of your implant / other medical device for information on the potential adverse effects of magnetic fields.    BESIDES CPAP, WHAT OTHER THERAPIES ARE THERE?    Positioning Device  Positioning devices are generally used when sleep apnea is mild and only occurs on your back.This example shows  a pillow that straps around the waist. It may be appropriate for those whose sleep study shows milder sleep apnea that occurs primarily when lying flat on one's back. Preliminary studies have shown benefit but effectiveness at home may need to be verified by a home sleep test. These devices are generally not covered by medical insurance.  Examples of devices that maintain sleeping on the back to prevent snoring and mild sleep apnea.    Belt type body positioner  http://[employer]/    Electronic reminder  http://nightshifttherapy.com/            Oral Appliance  What is oral appliance therapy?  An oral appliance device fits on your teeth at night like a retainer used after having braces. The device is made by a specialized dentist and requires several visits over 1-2 months before a manufactured device is made to fit your teeth and is adjusted to prevent your sleep apnea. Once an oral device is working properly, snoring should be improved. A home sleep test may be recommended at that time if to determine whether the sleep apnea is adequately treated.       Some things to remember:  -Oral devices are often, but not always, covered by your medical insurance. Be sure to check with your insurance provider.   -If you are referred for oral therapy, you will be given a list of specialized dentists to consider or you may choose to visit the Web site of the American Academy of Dental Sleep Medicine  -Oral devices are less likely to work if you have severe sleep apnea or are extremely overweight.     More detailed information  An oral appliance is a small acrylic device that fits over the upper and lower teeth  (similar to a retainer or a mouth guard). This device slightly moves jaw forward, which moves the base of the tongue forward, opens the airway, improves breathing for effective treat snoring and obstructive sleep apnea in perhaps 7 out of 10 people .  The best working devices are custom-made by a dental device   after a mold is made of the teeth 1, 2, 3.  When is an oral appliance indicated?  Oral appliance therapy is recommended as a first-line treatment for patients with primary snoring, mild sleep apnea, and for patients with moderate sleep apnea who prefer appliance therapy to use of CPAP4, 5. Severity of sleep apnea is determined by sleep testing and is based on the number of respiratory events per hour of sleep.   How successful is oral appliance therapy?  The success rate of oral appliance therapy in patients with mild sleep apnea is 75-80% while in patients with moderate sleep apnea it is 50-70%. The chance of success in patients with severe sleep apnea is 40-50%. The research also shows that oral appliances have a beneficial effect on the cardiovascular health of JODI patients at the same magnitude as CPAP therapy7.  Oral appliances should be a second-line treatment in cases of severe sleep apnea, but if not completely successful then a combination therapy utilizing CPAP plus oral appliance therapy may be effective. Oral appliances tend to be effective in a broad range of patients although studies show that the patients who have the highest success are females, younger patients, those with milder disease, and less severe obesity. 3, 6.   Finding a dentist that practices dental sleep medicine  Specific training is available through the American Academy of Dental Sleep Medicine for dentists interested in working in the field of sleep. To find a dentist who is educated in the field of sleep and the use of oral appliances, near you, visit the Web site of the American Academy of Dental Sleep Medicine.    References  1. Shun, et al. Objectively measured vs self-reported compliance during oral appliance therapy for sleep-disordered breathing. Chest 2013; 144(5): 7428-2786.  2. Mounika et al. Objective measurement of compliance during oral appliance therapy for sleep-disordered breathing. Thorax  2013; 68(1): 91-96.  3. Deja et al. Mandibular advancement devices in 620 men and women with JODI and snoring: tolerability and predictors of treatment success. Chest 2004; 125: 0565-5465.  4. Reilly et al. Oral appliances for snoring and JODI: a review. Sleep 2006; 29: 244-262.  5. Kavita et al. Oral appliance treatment for JODI: an update. J Clin Sleep Med 2014; 10(2): 215-227.  6. Ashwin et al. Predictors of OSAH treatment outcome. J Dent Res 2007; 86: 8510-5845.      Weight Loss:   Your Body mass index is 65.72 kg/m .    Being overweight does not necessarily mean you will have health consequences.  Those who have BMI over 30 or over 27 with existing medical conditions carries greater risk.   Weight loss decreases severity of sleep apnea in most people with obesity. For those with mild obesity who have developed snoring with weight gain, even 15-30 pound weight loss can improve and occasionally milder eliminate sleep apnea.  Structured and life-long dietary and health habits are necessary to lose weight and keep healthier weight levels.     The Comprehensive Weight loss program offers all aspects of weight loss strategies including two Non-Surgical Weight Loss Programs: Medical Weight Management and our 24 Week Healthy Lifestyle Program:  Medical Weight Management: You will meet with a Medical Weight Management Provider, as well as a Registered Dietician. The program may include medication therapy, dietary education, recommended exercise and physical therapy programs, monthly support group meetings, and possible psychological counseling. Follow up visits with the provider or dietician are scheduled based on your progress and needs.  24 Week Healthy Lifestyle Program: This unique program is designed to give you the support of weekly appointments and activities thru a 24-week period. It may include all of the components of the basic program (above), with the addition of 11 individual Health   Visits, 24-week access to the [employer] website for over 700 online classes, and monthly support group meetings. This program has an out-of-pocket expense of $499 to cover the items that can not be billed to insurance (health coaches and [employer] access), and is non-refundable/non-transferable (you may be able to use a Health Savings Account; ask your HSA provider). There may be an optional meal replacement plan prescribed as well.   Medication therapy has been approved for the treatment of sleep apnea: The FDA approved tirzepatide for moderate to severe sleep apnea (apnea-hypopnea index greater than or equal to 15) in patients with BMI of greater than or equal to 30, or BMI greater than or equal to 27 with at least 1 weight-related condition such as hypertension or dyslipidemia.  Surgical management achieves meaningful long-term weight loss and improvement in health risks in most patients with more severe obesity.      Sleep Apnea Surgery:    Surgery for obstructive sleep apnea is considered generally only when other therapies fail to work. Surgery may be discussed with you if you are having a difficult time tolerating CPAP and or when there is an abnormal structure that requires surgical correction.  Nose and throat surgeries often enlarge the airway to prevent collapse.  Most of these surgeries create pain for 1-2 weeks and up to half of the most common surgeries are not effective throughout life.  You should carefully discuss the benefits and drawbacks to surgery with your sleep provider and surgeon to determine if it is the best solution for you.   More information  Surgery for JODI is directed at areas that are responsible for narrowing or complete obstruction of the airway during sleep.  There are a wide range of procedures available to enlarge and/or stabilize the airway to prevent blockage of breathing in the three major areas where it can occur: the palate, tongue, and nasal regions.  Successful surgical  treatment depends on the accurate identification of the factors responsible for obstructive sleep apnea in each person.  A personalized approach is required because there is no single treatment that works well for everyone.  Because of anatomic variation, consultation with an examination by a sleep surgeon is a critical first step in determining what surgical options are best for each patient.  In some cases, examination during sedation may be recommended in order to guide the selection of procedures.  Patients will be counseled about risks and benefits as well as the typical recovery course after surgery. Surgery is typically not a cure for a person s JODI.  However, surgery will often significantly improve one s JODI severity (termed  success rate ).  Even in the absence of a cure, surgery will decrease the cardiovascular risk associated with OSA7; improve overall quality of life8 (sleepiness, functionality, sleep quality, etc).      Palate Procedures:  Patients with JODI often have narrowing of their airway in the region of their tonsils and uvula.  The goals of palate procedures are to widen the airway in this region as well as to help the tissues resist collapse.  Modern palate procedure techniques focus on tissue conservation and soft tissue rearrangement, rather than tissue removal.  Often the uvula is preserved in this procedure. Residual sleep apnea is common in patient after pharyngoplasty with an average reduction in sleep apnea events of 33%2.      Tongue Procedures:  ExamWhile patients are awake, the muscles that surround the throat are active and keep this region open for breathing. These muscles relax during sleep, allowing the tongue and other structures to collapse and block breathing.  There are several different tongue procedures available.  Selection of a tongue base procedure depends on characteristics seen on physical exam.  Generally, procedures are aimed at removing bulky tissues in this area or  preventing the back of the tongue from falling back during sleep.  Success rates for tongue surgery range from 50-62%3.    Hypoglossal Nerve Stimulation:  Hypoglossal nerve stimulation has recently received approval from the [place] Food and Drug Administration for the treatment of obstructive sleep apnea.  This is based on research showing that the system was safe and effective in treating sleep apnea6.  Results showed that the median AHI score decreased 68%, from 29.3 to 9.0. This therapy uses an implant system that senses breathing patterns and delivers mild stimulation to airway muscles, which keeps the airway open during sleep.  The system consists of three fully implanted components: a small generator (similar in size to a pacemaker), a breathing sensor, and a stimulation lead.  Using a small handheld remote, a patient turns the therapy on before bed and off upon awakening.    Candidates for this device must be greater than 18 years of age, have moderate to severe obstructive sleep apnea with less than 25% central events  (AHI between 15-65), BMI less than 35, have tried CPAP/oral appliance for at least 8 weeks without success, and have appropriate upper airway anatomy (determined by a sleep endoscopy performed by Dr. Bartolome Velásquez or Dr. Brennan Solorio).     Nasal Procedures:  Nasal obstruction can interfere with nasal breathing during the day and night.  Studies have shown that relief of nasal obstruction can improve the ability of some patients to tolerate positive airway pressure therapy for obstructive sleep apnea1.  Treatment options include medications such as nasal saline, topical corticosteroid and antihistamine sprays, and oral medications such as antihistamines or decongestants. Non-surgical treatments can include external nasal dilators for selected patients. If these are not successful by themselves, surgery can improve the nasal airway either alone or in combination with these other  options.        Combination Procedures:  Combination of surgical procedures and other treatments may be recommended, particularly if patients have more than one area of narrowing or persistent positional disease.  The success rate of combination surgery ranges from 66-80%2,3.    References  Heidi CARRERO. The Role of the Nose in Snoring and Obstructive Sleep Apnoea: An Update.  Eur Arch Otorhinolaryngol. 2011; 268: 1365-73.   Benjie SM; Moses JA; Kaleb JR; Pallanch JF; Oly MB; Jenni SG; Andrew QUIÑONES. Surgical modifications of the upper airway for obstructive sleep apnea in adults: a systematic review and meta-analysis. SLEEP 2010;33(10):8879-6731. Baljinder PAULSON. Hypopharyngeal surgery in obstructive sleep apnea: an evidence-based medicine review.  Arch Otolaryngol Head Neck Surg. 2006 Feb;132(2):206-13.  Tono YH1, Irvin Y, Loco POONAM. The efficacy of anatomically based multilevel surgery for obstructive sleep apnea. Otolaryngol Head Neck Surg. 2003 Oct;129(4):327-35.  Kezirian E, Goldberg A. Hypopharyngeal Surgery in Obstructive Sleep Apnea: An Evidence-Based Medicine Review. Arch Otolaryngol Head Neck Surg. 2006 Feb;132(2):206-13.  Jerry UD et al. Upper-Airway Stimulation for Obstructive Sleep Apnea.  N Engl J Med. 2014 Jan 9;370(2):139-49.  Marina Y et al. Increased Incidence of Cardiovascular Disease in Middle-aged Men with Obstructive Sleep Apnea. Am J Respir Crit Care Med; 2002 166: 159-165  Martinez EM et al. Studying Life Effects and Effectiveness of Palatopharyngoplasty (SLEEP) study: Subjective Outcomes of Isolated Uvulopalatopharyngoplasty. Otolaryngol Head Neck Surg. 2011; 144: 623-631.        WHAT IF I ONLY HAVE SNORING?    Mandibular advancement devices, lateral sleep positioning, long-term weight loss and treatment of nasal allergies have been shown to improve snoring.  Exercising tongue muscles with a game (https://apps.[employer]/us/shantel/soundly-reduce-snoring/uq3680465897) or stimulating the tongue during  the day with a device (https://doi.org/10.3390/uoe84443279) have improved snoring in some individuals.  https://www.[employer]LittleLives/  https://www.sleepfoundation.org/best-anti-snoring-mouthpieces-and-mouthguards    Remember to Drive Safe... Drive Alive     Sleep health profoundly affects your health, mood, and your safety.  Thirty three percent of the population (one in three of us) is not getting enough sleep and many have a sleep disorder. Not getting enough sleep or having an untreated / undertreated sleep condition may make us sleepy without even knowing it. In fact, our driving could be dramatically impaired due to our sleep health. As your provider, here are some things I would like you to know about driving:     Here are some warning signs for impairment and dangerous drowsy driving:              -Having been awake more than 16 hours               -Looking tired               -Eyelid drooping              -Head nodding (it could be too late at this point)              -Driving for more than 30 minutes     Some things you could do to make the driving safer if you are experiencing some drowsiness:              -Stop driving and rest              -Call for transportation              -Make sure your sleep disorder is adequately treated     Some things that have been shown NOT to work when experiencing drowsiness while driving:              -Turning on the radio              -Opening windows              -Eating any  distracting  /  entertaining  foods (e.g., sunflower seeds, candy, or any other)              -Talking on the phone      Your decision may not only impact your life, but also the life of others. Please, remember to drive safe for yourself and all of us.

## 2025-03-20 NOTE — NURSING NOTE
"Chief Complaint   Patient presents with    Sleep Problem     Tired even when I sleep 10 hours, trouble falling asleep, not waking feeling rested, some snoring       Initial /88   Pulse 95   Ht 1.6 m (5' 3\")   Wt (!) 168.3 kg (371 lb)   SpO2 96%   BMI 65.72 kg/m   Estimated body mass index is 65.72 kg/m  as calculated from the following:    Height as of this encounter: 1.6 m (5' 3\").    Weight as of this encounter: 168.3 kg (371 lb).    Medication Reconciliation: complete  ESS 8  Neck circumference: 48 centimeters.  Dang Maldonado MA   "

## 2025-04-05 ASSESSMENT — ANXIETY QUESTIONNAIRES
IF YOU CHECKED OFF ANY PROBLEMS ON THIS QUESTIONNAIRE, HOW DIFFICULT HAVE THESE PROBLEMS MADE IT FOR YOU TO DO YOUR WORK, TAKE CARE OF THINGS AT HOME, OR GET ALONG WITH OTHER PEOPLE: VERY DIFFICULT
3. WORRYING TOO MUCH ABOUT DIFFERENT THINGS: SEVERAL DAYS
GAD7 TOTAL SCORE: 10
GAD7 TOTAL SCORE: 10
1. FEELING NERVOUS, ANXIOUS, OR ON EDGE: MORE THAN HALF THE DAYS
6. BECOMING EASILY ANNOYED OR IRRITABLE: NEARLY EVERY DAY
7. FEELING AFRAID AS IF SOMETHING AWFUL MIGHT HAPPEN: MORE THAN HALF THE DAYS
4. TROUBLE RELAXING: SEVERAL DAYS
GAD7 TOTAL SCORE: 10
8. IF YOU CHECKED OFF ANY PROBLEMS, HOW DIFFICULT HAVE THESE MADE IT FOR YOU TO DO YOUR WORK, TAKE CARE OF THINGS AT HOME, OR GET ALONG WITH OTHER PEOPLE?: VERY DIFFICULT
2. NOT BEING ABLE TO STOP OR CONTROL WORRYING: SEVERAL DAYS
7. FEELING AFRAID AS IF SOMETHING AWFUL MIGHT HAPPEN: MORE THAN HALF THE DAYS
5. BEING SO RESTLESS THAT IT IS HARD TO SIT STILL: NOT AT ALL

## 2025-04-08 ENCOUNTER — VIRTUAL VISIT (OUTPATIENT)
Dept: INTERNAL MEDICINE | Facility: CLINIC | Age: 35
End: 2025-04-08
Payer: COMMERCIAL

## 2025-04-08 DIAGNOSIS — F41.9 ANXIETY: ICD-10-CM

## 2025-04-08 DIAGNOSIS — F33.1 MODERATE EPISODE OF RECURRENT MAJOR DEPRESSIVE DISORDER (H): ICD-10-CM

## 2025-04-08 PROCEDURE — 98006 SYNCH AUDIO-VIDEO EST MOD 30: CPT | Performed by: INTERNAL MEDICINE

## 2025-04-08 RX ORDER — FEXOFENADINE HCL 180 MG/1
TABLET ORAL
COMMUNITY

## 2025-04-08 RX ORDER — VENLAFAXINE HYDROCHLORIDE 150 MG/1
150 CAPSULE, EXTENDED RELEASE ORAL DAILY
Qty: 30 CAPSULE | Refills: 5 | Status: SHIPPED | OUTPATIENT
Start: 2025-04-08

## 2025-04-08 NOTE — PROGRESS NOTES
Iris is a 34 year old who is being evaluated via a billable video visit.    How would you like to obtain your AVS? MyChart  If the video visit is dropped, the invitation should be resent by: Send to e-mail at: [email or web address]  Will anyone else be joining your video visit? No      Assessment & Plan   (F41.9) Anxiety  Comment: - Anxiety is present and has been exacerbated by stressors at home and work, including a filing project and financial concerns. Anxiety was better managed on a higher dose of Effexor (150 mg).  - Increase Effexor to 150 mg daily. Schedule a follow-up in approximately three months. Consider therapy as an option, though not currently pursued.  Plan: venlafaxine (EFFEXOR XR) 150 MG 24 hr capsule          (F33.1) Moderate episode of recurrent major depressive disorder (H)  Comment: - Anxiety is present and has been exacerbated by stressors at home and work, including a filing project and financial concerns. Anxiety was better managed on a higher dose of Effexor (150 mg).  - Increase Effexor to 150 mg daily. Schedule a follow-up in approximately three months. Consider therapy as an option, though not currently pursued.  Plan: venlafaxine (EFFEXOR XR) 150 MG 24 hr capsule        Patient Instructions   I've sent refills to your pharmacy for Effexor (Venlafaxine)  mg to take once daily.    Someone will contact you to help you schedule an appointment in three months. Call sooner if problems.     Subjective   Iris is a 34 year old, presenting for the following health issues:  Follow Up (Depression/anxiety)      4/8/2025     3:29 PM   Additional Questions   Roomed by Roxane DAVIS CMA     Video Start Time: 1700    History of Present Illness       Mental Health Follow-up:  Patient presents to follow-up on Depression & Anxiety.Patient's depression since last visit has been:  Medium  The patient is having other symptoms associated with depression.  Patient's anxiety since last visit has been:   Worse  The patient is having other symptoms associated with anxiety.  Any significant life events: No  Patient is feeling anxious or having panic attacks.  Patient has no concerns about alcohol or drug use.    She eats 0-1 servings of fruits and vegetables daily.She consumes 0 sweetened beverage(s) daily. She exercises with enough effort to increase her heart rate 3 or less days per week. She is missing 1 dose(s) of medications per week.  She is not taking prescribed medications regularly due to remembering to take.        - Previously on Effexor 150 mg, reduced to 75 mg due to weird dreams and perceived high dose.  - Experienced increased stress and decreased functionality on the lower dose.  - Reports that life was better and more functional on Effexor 150 mg.  - Weird dreams persisted even on the lower dose, leading to uncertainty about their cause.  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  - Asked about recent therapy--she reports having a history of therapy from age 11 until 2019, but not in the last year or two.  - Expresses a need to lose weight but feels unable to add more stressors currently.  - Weight presents difficulty with hygiene (eg, wiping after us e of toilet, for both urination and defecation).        Past medical, family and social histories as well as medications reviewed and updated as needed.    REVIEW OF SYSTEMS: The following systems have been completely reviewed and are negative except as noted above:   Constitutional, gastrointestinal, genitourinary, psychiatric, and neurologic systems.        Objective    Vitals - Patient Reported  Weight (Patient Reported): 170.1 kg (375 lb)        Physical Exam   GENERAL: alert and no distress  EYES: Eyes grossly normal to inspection.  No discharge or erythema, or obvious scleral/conjunctival abnormalities.  RESP: No audible wheeze, cough, or visible cyanosis.    SKIN: Visible skin clear. No significant rash, abnormal  pigmentation or lesions.  NEURO: Cranial nerves grossly intact.  Mentation and speech appropriate for age.  PSYCH: Appropriate affect, tone, and pace of words        Video-Visit Details    Type of service:  Video Visit   Video End Time: 1717  Originating Location (pt. Location): Home    Distant Location (provider location):  On-site  Platform used for Video Visit: Jesús  Signed Electronically by: Triston Licona MD,

## 2025-04-09 NOTE — PATIENT INSTRUCTIONS
I've sent refills to your pharmacy for Effexor (Venlafaxine)  mg to take once daily.    Someone will contact you to help you schedule an appointment in three months. Call sooner if problems.

## 2025-05-27 ENCOUNTER — MYC REFILL (OUTPATIENT)
Dept: OBGYN | Facility: CLINIC | Age: 35
End: 2025-05-27
Payer: COMMERCIAL

## 2025-05-27 DIAGNOSIS — N92.6 IRREGULAR MENSES: Chronic | ICD-10-CM

## 2025-05-27 DIAGNOSIS — N91.2 ANOVULATORY AMENORRHEA: ICD-10-CM

## 2025-05-29 RX ORDER — MEDROXYPROGESTERONE ACETATE 5 MG
TABLET ORAL
Qty: 36 TABLET | Refills: 0 | Status: SHIPPED | OUTPATIENT
Start: 2025-05-29

## 2025-06-30 ASSESSMENT — SLEEP AND FATIGUE QUESTIONNAIRES
HOW LIKELY ARE YOU TO NOD OFF OR FALL ASLEEP WHILE SITTING AND TALKING TO SOMEONE: WOULD NEVER DOZE
HOW LIKELY ARE YOU TO NOD OFF OR FALL ASLEEP WHILE SITTING AND READING: WOULD NEVER DOZE
HOW LIKELY ARE YOU TO NOD OFF OR FALL ASLEEP WHILE SITTING INACTIVE IN A PUBLIC PLACE: WOULD NEVER DOZE
HOW LIKELY ARE YOU TO NOD OFF OR FALL ASLEEP WHILE WATCHING TV: SLIGHT CHANCE OF DOZING
HOW LIKELY ARE YOU TO NOD OFF OR FALL ASLEEP IN A CAR, WHILE STOPPED FOR A FEW MINUTES IN TRAFFIC: WOULD NEVER DOZE
HOW LIKELY ARE YOU TO NOD OFF OR FALL ASLEEP WHILE SITTING QUIETLY AFTER LUNCH WITHOUT ALCOHOL: WOULD NEVER DOZE
HOW LIKELY ARE YOU TO NOD OFF OR FALL ASLEEP WHILE LYING DOWN TO REST IN THE AFTERNOON WHEN CIRCUMSTANCES PERMIT: HIGH CHANCE OF DOZING
HOW LIKELY ARE YOU TO NOD OFF OR FALL ASLEEP WHEN YOU ARE A PASSENGER IN A CAR FOR AN HOUR WITHOUT A BREAK: SLIGHT CHANCE OF DOZING

## 2025-07-01 ENCOUNTER — THERAPY VISIT (OUTPATIENT)
Dept: SLEEP MEDICINE | Facility: CLINIC | Age: 35
End: 2025-07-01
Payer: COMMERCIAL

## 2025-07-01 DIAGNOSIS — R06.83 SNORING: ICD-10-CM

## 2025-07-01 DIAGNOSIS — F41.9 ANXIETY: ICD-10-CM

## 2025-07-01 DIAGNOSIS — G47.09 OTHER INSOMNIA: ICD-10-CM

## 2025-07-01 DIAGNOSIS — E66.01 MORBID OBESITY (H): ICD-10-CM

## 2025-07-01 DIAGNOSIS — F33.1 MODERATE EPISODE OF RECURRENT MAJOR DEPRESSIVE DISORDER (H): ICD-10-CM

## 2025-07-02 LAB — SLPCOMP: NORMAL

## 2025-07-02 NOTE — NURSING NOTE
Completed a split night PSG per provider order.    Preliminary AHI 41.5.  A final therapeutic PAP pressure was achieved.    Supine REM was seen on therapeutic pressure.    Patient reports feeling refreshed in AM.

## 2025-07-06 ASSESSMENT — ANXIETY QUESTIONNAIRES
1. FEELING NERVOUS, ANXIOUS, OR ON EDGE: NEARLY EVERY DAY
3. WORRYING TOO MUCH ABOUT DIFFERENT THINGS: SEVERAL DAYS
7. FEELING AFRAID AS IF SOMETHING AWFUL MIGHT HAPPEN: SEVERAL DAYS
IF YOU CHECKED OFF ANY PROBLEMS ON THIS QUESTIONNAIRE, HOW DIFFICULT HAVE THESE PROBLEMS MADE IT FOR YOU TO DO YOUR WORK, TAKE CARE OF THINGS AT HOME, OR GET ALONG WITH OTHER PEOPLE: VERY DIFFICULT
5. BEING SO RESTLESS THAT IT IS HARD TO SIT STILL: SEVERAL DAYS
8. IF YOU CHECKED OFF ANY PROBLEMS, HOW DIFFICULT HAVE THESE MADE IT FOR YOU TO DO YOUR WORK, TAKE CARE OF THINGS AT HOME, OR GET ALONG WITH OTHER PEOPLE?: VERY DIFFICULT
GAD7 TOTAL SCORE: 11
2. NOT BEING ABLE TO STOP OR CONTROL WORRYING: SEVERAL DAYS
GAD7 TOTAL SCORE: 11
GAD7 TOTAL SCORE: 11
6. BECOMING EASILY ANNOYED OR IRRITABLE: MORE THAN HALF THE DAYS
7. FEELING AFRAID AS IF SOMETHING AWFUL MIGHT HAPPEN: SEVERAL DAYS
4. TROUBLE RELAXING: MORE THAN HALF THE DAYS

## 2025-07-07 LAB — SLPCOMP: NORMAL

## 2025-07-08 ENCOUNTER — OFFICE VISIT (OUTPATIENT)
Dept: INTERNAL MEDICINE | Facility: CLINIC | Age: 35
End: 2025-07-08
Payer: COMMERCIAL

## 2025-07-08 VITALS
HEIGHT: 63 IN | SYSTOLIC BLOOD PRESSURE: 148 MMHG | WEIGHT: 293 LBS | HEART RATE: 116 BPM | OXYGEN SATURATION: 97 % | DIASTOLIC BLOOD PRESSURE: 79 MMHG | TEMPERATURE: 97.7 F | RESPIRATION RATE: 16 BRPM | BODY MASS INDEX: 51.91 KG/M2

## 2025-07-08 DIAGNOSIS — G47.9 SLEEP DISORDER: ICD-10-CM

## 2025-07-08 DIAGNOSIS — F41.9 ANXIETY: Primary | ICD-10-CM

## 2025-07-08 DIAGNOSIS — F33.1 MODERATE EPISODE OF RECURRENT MAJOR DEPRESSIVE DISORDER (H): ICD-10-CM

## 2025-07-08 PROCEDURE — 99214 OFFICE O/P EST MOD 30 MIN: CPT | Performed by: INTERNAL MEDICINE

## 2025-07-08 RX ORDER — VENLAFAXINE HYDROCHLORIDE 225 MG/1
225 TABLET, EXTENDED RELEASE ORAL DAILY
Qty: 30 TABLET | Refills: 3 | Status: SHIPPED | OUTPATIENT
Start: 2025-07-08

## 2025-07-08 NOTE — PATIENT INSTRUCTIONS
"Let's raise the Effexor (venlafaxine) XR dose from 150 mg to 225 mg each day.     We'll defer any formal therapy referrals for now. Keep finding trusted friends to \"vent\".     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    Recommend a video or office appointment in about three months. Call sooner if needed.       "

## 2025-07-08 NOTE — PROGRESS NOTES
"  Assessment & Plan   (F41.9) Anxiety  (primary encounter diagnosis)  (F33.1) Moderate episode of recurrent major depressive disorder (H)  Comment: - Current Effexor (venlafaxine) dose of 150 mg may not be optimal. Consideration of increasing the dose to 225 mg.  - Increase Effexor dose to 225 mg. Follow-up in 2-3 months to assess response to medication adjustment. Option for follow-up via video or in-person.  - Risks and side effects: Discussed potential side effects of stopping medication abruptly.  Plan: venlafaxine (EFFEXOR-ER) 225 MG 24 hr tablet          (G47.9) Sleep disorder  Comment: Continue to follow up with Sleep Clinic.           BMI  Estimated body mass index is 66.98 kg/m  as calculated from the following:    Height as of this encounter: 1.6 m (5' 3\").    Weight as of this encounter: 171.5 kg (378 lb 1.6 oz).   Weight management plan: Discussed healthy diet and exercise guidelines        Patient Instructions   Let's raise the Effexor (venlafaxine) XR dose from 150 mg to 225 mg each day.     We'll defer any formal therapy referrals for now. Keep finding trusted friends to \"vent\".     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    Recommend a video or office appointment in about three months. Call sooner if needed.      Adelina Simmons is a 35 year old, presenting for the following health issues:  RECHECK (Follow up.)      7/8/2025     1:23 PM   Additional Questions   Roomed by SUZANNE Vincent   Accompanied by Self     History of Present Illness       Mental Health Follow-up:  Patient presents to follow-up on Depression & Anxiety.Patient's depression since last visit has been:  Bad  The patient is having other symptoms associated with depression.  Patient's anxiety since last visit has been:  Bad  The patient is having other symptoms associated with anxiety.  Any significant life events: health concerns  Patient is feeling anxious or having panic attacks.  Patient has no concerns about alcohol " or drug use.    Reason for visit:  Regular followup? yeny man    She eats 0-1 servings of fruits and vegetables daily.She consumes 0 sweetened beverage(s) daily.She exercises with enough effort to increase her heart rate 9 or less minutes per day.  She exercises with enough effort to increase her heart rate 3 or less days per week.   She is taking medications regularly.        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[employer], family dynamics, and living situation  - Sleep study performed last week, result of hypopnea reported    - History of Effexor (venlafaxine) dose reduction to 75 mg, then increased back to 150 mg approximately 3 months ago; unclear benefit from dose increase  - No side effects from Effexor reported; previous concern about vivid dreams attributed to medication, but no change in dreams with dose adjustments  - Past participation in talk therapy, including CBT and DBT, with therapy discontinued by provider  - Ongoing stressors include family expectations, inability to move out, and perceived inequity in parental support compared to sibling  - Difficulty with activities of daily living due to weight, requiring use of tongs for hygiene prior to obtaining them  - No current employment; unable to work due to health and stress  - No recent episodes of feeling refreshed upon waking except for April 28, 2025    Past medical, family and social histories as well as medications reviewed and updated as needed.    REVIEW OF SYSTEMS: The following systems have been completely reviewed and are negative except as noted above:   Constitutional, respiratory, genitourinary, musculoskeletal, endocrine,  "psychiatric, and neurologic systems.          Objective    Vitals: BP (!) 148/79 (BP Location: Right arm, Patient Position: Sitting, Cuff Size: Adult Large)   Pulse 116   Temp 97.7  F (36.5  C) (Oral)   Resp 16   Ht 1.6 m (5' 3\")   Wt (!) 171.5 kg (378 lb 1.6 oz)   LMP 06/14/2025 (Approximate)   SpO2 97%   BMI 66.98 kg/m    BMI= Body mass index is 66.98 kg/m .    Physical Exam   GENERAL: alert and no distress  EYES: Eyes grossly normal to inspection, PERRL and conjunctivae and sclerae normal  NEURO: Normal strength and tone, mentation intact and speech normal  PSYCH: mentation appears normal and affect frustrated/tearful at times          Signed Electronically by: Triston Licona MD,     "

## 2025-07-09 ENCOUNTER — MYC MEDICAL ADVICE (OUTPATIENT)
Dept: SLEEP MEDICINE | Facility: CLINIC | Age: 35
End: 2025-07-09
Payer: COMMERCIAL

## 2025-07-09 DIAGNOSIS — G47.33 OSA (OBSTRUCTIVE SLEEP APNEA): Primary | ICD-10-CM

## 2025-07-10 ENCOUNTER — MYC MEDICAL ADVICE (OUTPATIENT)
Dept: INTERNAL MEDICINE | Facility: CLINIC | Age: 35
End: 2025-07-10

## 2025-07-10 ENCOUNTER — TELEPHONE (OUTPATIENT)
Dept: INTERNAL MEDICINE | Facility: CLINIC | Age: 35
End: 2025-07-10
Payer: COMMERCIAL

## 2025-07-14 NOTE — TELEPHONE ENCOUNTER
Retail Pharmacy Prior Authorization Team   Phone: [phone]    PA Initiation    Medication: VENLAFAXINE HCL  MG PO TB24  Insurance Company: [employer]AP - Phone [phone] Fax [phone]  Pharmacy Filling the Rx: [id number] - [place], [place] - [address] [facility] [address]  Filling Pharmacy Phone: [phone]  Filling Pharmacy Fax:    Start Date: 7/14/2025

## 2025-07-14 NOTE — TELEPHONE ENCOUNTER
Prior Authorization Approval    Authorization Effective Date: 4/15/2025  Authorization Expiration Date: 7/14/2026  Medication: venlafaxine (EFFEXOR-ER) 225 MG 24 hr tablet-APPROVED  Reference #:     Insurance Company: [employer] - Phone [phone] Fax [phone]  Which Pharmacy is filling the prescription (Not needed for infusion/clinic administered): [id number] [place], [place] - [address] [place] [address] AT [facility] [address]  Pharmacy Notified: Yes  Patient Notified: Instructed pharmacy to notify patient when script is ready to /ship.

## 2025-07-28 ENCOUNTER — DOCUMENTATION ONLY (OUTPATIENT)
Dept: SLEEP MEDICINE | Facility: CLINIC | Age: 35
End: 2025-07-28
Payer: COMMERCIAL

## 2025-07-28 DIAGNOSIS — G47.33 OBSTRUCTIVE SLEEP APNEA (ADULT) (PEDIATRIC): Primary | ICD-10-CM

## 2025-07-28 NOTE — PROGRESS NOTES
Patient was offered choice of vendor and chose [facility].  Patient Iris Toscano was set up at [facility] on July 28, 2025. Patient received a Resmed Airsense 10 Pressures were set at 8-15 cm H2O.   Patient s ramp is 5 cm H2O for Auto and FLEX/EPR is 3.  Patient received a Resmed Mask name: Airfit F20  Full Face mask size Medium, heated tubing and heated humidifier.  Patient has the following compliance requirements: usage only    Makenzie Nunn

## 2025-07-31 ENCOUNTER — DOCUMENTATION ONLY (OUTPATIENT)
Dept: SLEEP MEDICINE | Facility: CLINIC | Age: 35
End: 2025-07-31
Payer: COMMERCIAL

## 2025-08-13 ENCOUNTER — DOCUMENTATION ONLY (OUTPATIENT)
Dept: SLEEP MEDICINE | Facility: CLINIC | Age: 35
End: 2025-08-13
Payer: COMMERCIAL

## 2025-09-02 ASSESSMENT — SLEEP AND FATIGUE QUESTIONNAIRES
HOW LIKELY ARE YOU TO NOD OFF OR FALL ASLEEP IN A CAR, WHILE STOPPED FOR A FEW MINUTES IN TRAFFIC: WOULD NEVER DOZE
HOW LIKELY ARE YOU TO NOD OFF OR FALL ASLEEP WHILE SITTING AND TALKING TO SOMEONE: WOULD NEVER DOZE
HOW LIKELY ARE YOU TO NOD OFF OR FALL ASLEEP WHILE SITTING QUIETLY AFTER LUNCH WITHOUT ALCOHOL: SLIGHT CHANCE OF DOZING
HOW LIKELY ARE YOU TO NOD OFF OR FALL ASLEEP WHILE SITTING AND READING: SLIGHT CHANCE OF DOZING
HOW LIKELY ARE YOU TO NOD OFF OR FALL ASLEEP WHILE WATCHING TV: MODERATE CHANCE OF DOZING
HOW LIKELY ARE YOU TO NOD OFF OR FALL ASLEEP WHEN YOU ARE A PASSENGER IN A CAR FOR AN HOUR WITHOUT A BREAK: SLIGHT CHANCE OF DOZING
HOW LIKELY ARE YOU TO NOD OFF OR FALL ASLEEP WHILE LYING DOWN TO REST IN THE AFTERNOON WHEN CIRCUMSTANCES PERMIT: HIGH CHANCE OF DOZING
HOW LIKELY ARE YOU TO NOD OFF OR FALL ASLEEP WHILE SITTING INACTIVE IN A PUBLIC PLACE: WOULD NEVER DOZE

## 2025-09-04 ENCOUNTER — OFFICE VISIT (OUTPATIENT)
Dept: SLEEP MEDICINE | Facility: CLINIC | Age: 35
End: 2025-09-04
Payer: COMMERCIAL

## 2025-09-04 VITALS
WEIGHT: 293 LBS | OXYGEN SATURATION: 95 % | HEART RATE: 96 BPM | DIASTOLIC BLOOD PRESSURE: 82 MMHG | HEIGHT: 63 IN | SYSTOLIC BLOOD PRESSURE: 144 MMHG | BODY MASS INDEX: 51.91 KG/M2

## 2025-09-04 DIAGNOSIS — G47.33 OSA (OBSTRUCTIVE SLEEP APNEA): Primary | ICD-10-CM
